# Patient Record
Sex: FEMALE | Race: WHITE | HISPANIC OR LATINO | Employment: FULL TIME | URBAN - METROPOLITAN AREA
[De-identification: names, ages, dates, MRNs, and addresses within clinical notes are randomized per-mention and may not be internally consistent; named-entity substitution may affect disease eponyms.]

---

## 2017-08-03 ENCOUNTER — TELEPHONE (OUTPATIENT)
Dept: INTERNAL MEDICINE | Facility: CLINIC | Age: 22
End: 2017-08-03

## 2017-08-03 ENCOUNTER — OFFICE VISIT (OUTPATIENT)
Dept: INTERNAL MEDICINE | Facility: CLINIC | Age: 22
End: 2017-08-03

## 2017-08-03 VITALS
HEART RATE: 86 BPM | SYSTOLIC BLOOD PRESSURE: 102 MMHG | DIASTOLIC BLOOD PRESSURE: 60 MMHG | WEIGHT: 143 LBS | BODY MASS INDEX: 26.31 KG/M2 | OXYGEN SATURATION: 97 % | HEIGHT: 62 IN

## 2017-08-03 DIAGNOSIS — R10.30 LOWER ABDOMINAL PAIN: ICD-10-CM

## 2017-08-03 DIAGNOSIS — Z00.00 HEALTHCARE MAINTENANCE: Primary | ICD-10-CM

## 2017-08-03 PROCEDURE — 99385 PREV VISIT NEW AGE 18-39: CPT | Performed by: NURSE PRACTITIONER

## 2017-08-03 NOTE — PROGRESS NOTES
"Subjective   Vernell Ornelas is a 21 y.o. female and is here for a comprehensive physical exam. New patient here to establish care.  Health history and questionnaire have been reviewed in its entirety. The patient's previous primary care provider was Dr. Mariajose Shrestha - her pediatrician.  The patient reports lower abdominal pain.  Patient complains of a \"nagging\" pain in her lower abdomen.  She did go to [facility] emergency room on July 31 where she had a CT scan done.  CT scan and labs were normal.  Patient states that when she first had the pain, it lasted a couple of hours and had her doubled over in pain.  Since that time it's been more of a dull ache.  Patient does have an IUD and had not had a menstrual cycle since she had it put in about 2 years ago, but the past couple months she has had some spotting.  She has not seen a gynecologist since having the IUD placed, because she is new to the area.     Do you take any herbs or supplements that were not prescribed by a doctor? no  Are you taking aspirin daily? no     History:  IUD placed summer 2015. She is sexually active. She has not had a pap    The following portions of the patient's history were reviewed and updated as appropriate: allergies, current medications, past family history, past medical history, past social history, past surgical history and problem list.    Review of Systems  Do you have pain that bothers you in your daily life? no  Pertinent items are noted in HPI.    Objective   /60 (BP Location: Left arm, Patient Position: Sitting, Cuff Size: Adult)  Pulse 86  Ht 62\" (157.5 cm)  Wt 143 lb (64.9 kg)  SpO2 97%  BMI 26.16 kg/m2    General Appearance:    Alert, cooperative, no distress, appears stated age   Head:    Normocephalic, without obvious abnormality, atraumatic   Eyes:    PERRL, conjunctiva/corneas clear, EOM's intact, fundi     benign, both eyes   Ears:    Normal TM's and external ear canals, both ears   Nose:   Nares normal, " septum midline, mucosa normal, no drainage    or sinus tenderness   Throat:   Lips, mucosa, and tongue normal; teeth and gums normal   Neck:   Supple, symmetrical, trachea midline, no adenopathy;     thyroid:  no enlargement/tenderness/nodules; no carotid    bruit or JVD   Back:     Symmetric, no curvature, ROM normal, no CVA tenderness   Lungs:     Clear to auscultation bilaterally, respirations unlabored   Chest Wall:    No tenderness or deformity    Heart:    Regular rate and rhythm, S1 and S2 normal, no murmur, rub   or gallop       Abdomen:     Soft, non-tender, bowel sounds active all four quadrants,     no masses, no organomegaly           Extremities:   Extremities normal, atraumatic, no cyanosis or edema   Pulses:   2+ and symmetric all extremities   Skin:   Skin color, texture, turgor normal, no rashes or lesions   Lymph nodes:   Cervical, supraclavicular, and axillary nodes normal   Neurologic:   CNII-XII intact, normal strength, sensation and reflexes     throughout     Reviewed CT abd/pelvis and labs done at [facility] - to be scanned into chart. CT was negative. Labs were normal.      Assessment/Plan   Healthy female exam.     1. Vernell was seen today for annual exam and abdominal pain.    Diagnoses and all orders for this visit:    Healthcare maintenance    Lower abdominal pain  -     Ambulatory Referral to Gynecology    Will try to get her into gynecology asap since she will be leaving for college on aug 12th.    2. Patient Counseling:  --Nutrition: Stressed importance of moderation in sodium/caffeine intake, saturated fat and cholesterol, caloric balance, sufficient intake of fresh fruits, vegetables, fiber, calcium, iron.  --Exercise: Stressed the importance of regular exercise.   --Injury prevention: Discussed safety belts, safety helmets, smoke detector.   --Dental health: Discussed importance of regular tooth brushing, flossing, and dental visits.  --Immunizations reviewed. Patient states that her  immunizations are up to date and she will provide us with a copy of her medical records.     3. Discussed the patient's BMI with her.  The BMI is in the acceptable range  4. Follow up as needed for acute illness

## 2017-08-09 ENCOUNTER — OFFICE VISIT (OUTPATIENT)
Dept: OBSTETRICS AND GYNECOLOGY | Facility: CLINIC | Age: 22
End: 2017-08-09

## 2017-08-09 ENCOUNTER — PROCEDURE VISIT (OUTPATIENT)
Dept: OBSTETRICS AND GYNECOLOGY | Facility: CLINIC | Age: 22
End: 2017-08-09

## 2017-08-09 VITALS
DIASTOLIC BLOOD PRESSURE: 72 MMHG | BODY MASS INDEX: 26.5 KG/M2 | WEIGHT: 144 LBS | HEIGHT: 62 IN | SYSTOLIC BLOOD PRESSURE: 108 MMHG

## 2017-08-09 DIAGNOSIS — Z30.431 IUD CHECK UP: ICD-10-CM

## 2017-08-09 DIAGNOSIS — Z13.9 SCREENING: Primary | ICD-10-CM

## 2017-08-09 DIAGNOSIS — R10.2 PELVIC PAIN: ICD-10-CM

## 2017-08-09 DIAGNOSIS — N89.8 VAGINAL DISCHARGE: ICD-10-CM

## 2017-08-09 DIAGNOSIS — R58 BLEEDING: ICD-10-CM

## 2017-08-09 DIAGNOSIS — R10.2 PELVIC PAIN IN FEMALE: Primary | ICD-10-CM

## 2017-08-09 LAB

## 2017-08-09 PROCEDURE — 99203 OFFICE O/P NEW LOW 30 MIN: CPT | Performed by: OBSTETRICS & GYNECOLOGY

## 2017-08-09 PROCEDURE — 81002 URINALYSIS NONAUTO W/O SCOPE: CPT | Performed by: OBSTETRICS & GYNECOLOGY

## 2017-08-09 PROCEDURE — 81025 URINE PREGNANCY TEST: CPT | Performed by: OBSTETRICS & GYNECOLOGY

## 2017-08-09 PROCEDURE — 76830 TRANSVAGINAL US NON-OB: CPT | Performed by: OBSTETRICS & GYNECOLOGY

## 2017-08-09 NOTE — PROGRESS NOTES
GYN New Pt Exam     CC- Here for pelvic pain    Vernell Ornelas is a 21 y.o. female who presents to establish care and for a 3 week h/o pelvic pain and cramping. She has had a Mirena IUD for 2 years and likes it. She has rare cycles and no pain with sex. She describes this pain as a pressure type sensation over her pubic bone and occ sharp pains in the ovarian area. She has not had any fevers, chills, discharge or VB. She has not noticed any alleviating or aggrevating factors One day her pain was so bad she went to [facility] and had a CT scan and everything was fine. Her pain has not been as bad since then but she wants to make sure her IUD is okay before she goes back to school.     OB History      Para Term  AB TAB SAB Ectopic Multiple Living    0 0 0 0 0 0 0 0 0 0        Obstetric Comments    No plans          Menarche: 17  Current contraception: IUD  History of abnormal Pap smear: no  History of abnormal mammogram: no  Family history of uterine, colon or ovarian cancer: no  Family history of breast cancer: no  H/o STDs: none  S/P Gardasil    Health Maintenance   Topic Date Due   • INFLUENZA VACCINE  2017   • TDAP/TD VACCINES (2 - Td) 2018       Past Medical History:   Diagnosis Date   • Joint pain     or swelling       Past Surgical History:   Procedure Laterality Date   • CYSTECTOMY  2015    cyst removal on left foot        No current outpatient prescriptions on file.    No Known Allergies    Social History   Substance Use Topics   • Smoking status: Never Smoker   • Smokeless tobacco: Never Used   • Alcohol use No       Family History   Problem Relation Age of Onset   • Hypertension Other    • Heart disease Other    • Hypertension Mother    • Thyroid disease Mother    • Depression Mother    • Anxiety disorder Mother    • Heart disease Maternal Grandmother    • Anxiety disorder Maternal Grandfather    • Breast cancer Neg Hx    • Ovarian cancer Neg Hx    • Colon cancer Neg Hx    •  "Deep vein thrombosis Neg Hx    • Pulmonary embolism Neg Hx    • Birth defects Neg Hx    • Mental retardation Neg Hx        Review of Systems   Constitutional: Negative for appetite change, fatigue, fever and unexpected weight change.   Respiratory: Negative for cough and shortness of breath.    Cardiovascular: Negative for chest pain and palpitations.   Gastrointestinal: Negative for abdominal distention, abdominal pain, anal bleeding, blood in stool, constipation, diarrhea, nausea and vomiting.   Genitourinary: Positive for pelvic pain. Negative for dyspareunia, dysuria, flank pain, frequency, hematuria, menstrual problem, vaginal discharge and vaginal pain.   Skin: Negative for color change and rash.   Neurological: Negative for headaches.   Psychiatric/Behavioral: Negative for dysphoric mood. The patient is not nervous/anxious.        /72  Ht 62\" (157.5 cm)  Wt 144 lb (65.3 kg)  LMP 07/14/2017  BMI 26.34 kg/m2    Physical Exam   Constitutional: She is oriented to person, place, and time. She appears well-developed and well-nourished.   HENT:   Head: Normocephalic and atraumatic.   Cardiovascular: Normal rate, regular rhythm and normal heart sounds.    Pulmonary/Chest: Effort normal and breath sounds normal.   Abdominal: Soft. Bowel sounds are normal. She exhibits no distension and no mass. There is no tenderness. There is no rebound and no guarding. No hernia.   Genitourinary: Uterus normal. There is no rash, tenderness, lesion or injury on the right labia. There is no rash, tenderness, lesion or injury on the left labia. Cervix exhibits no motion tenderness, no discharge and no friability. Right adnexum displays no mass, no tenderness and no fullness. Left adnexum displays no mass, no tenderness and no fullness. No erythema, tenderness or bleeding in the vagina. No signs of injury around the vagina. No vaginal discharge found.   Genitourinary Comments: IUD string seen easily  Nonspecific white D/C in " vault  Pain not reproducible on exam.   Musculoskeletal: Normal range of motion.   Neurological: She is alert and oriented to person, place, and time.   Skin: Skin is warm and dry.   Psychiatric: She has a normal mood and affect. Her behavior is normal. Judgment and thought content normal.   Nursing note and vitals reviewed.         Assessment/Plan    1) New pt to establish care  2) Pelvic pain- US today shows IUD in correct position and normal ovaries. CT reviewed and was normal. No clear etiology of pain and it is not reproducible on exam. Will check UA and swab for BV/C/G/T and Mycoplasma. If normal and pain persists may need dx lsc.   3) RHM- plan pap age 23. S/P Gardasil. RTO 1 year annual or prn.    Vernell was seen today for follow-up.    Diagnoses and all orders for this visit:    Screening  -     POC Urinalysis Dipstick  -     POC Pregnancy, Urine    Pelvic pain  -     NuSwab VG+  -     Urine Culture    Vaginal discharge  -     NuSwab VG+  -     Urine Culture          Ashlie Moses MD  8/9/2017  12:28 PM

## 2017-08-11 LAB
BACTERIA UR CULT: NORMAL
BACTERIA UR CULT: NORMAL

## 2017-08-13 LAB
A VAGINAE DNA VAG QL NAA+PROBE: NORMAL SCORE
BVAB2 DNA VAG QL NAA+PROBE: NORMAL SCORE
C ALBICANS DNA VAG QL NAA+PROBE: NEGATIVE
C GLABRATA DNA VAG QL NAA+PROBE: NEGATIVE
C TRACH RRNA SPEC QL NAA+PROBE: NEGATIVE
MEGA1 DNA VAG QL NAA+PROBE: NORMAL SCORE
N GONORRHOEA RRNA SPEC QL NAA+PROBE: NEGATIVE
T VAGINALIS RRNA SPEC QL NAA+PROBE: NEGATIVE

## 2018-05-07 ENCOUNTER — OFFICE VISIT (OUTPATIENT)
Dept: INTERNAL MEDICINE | Facility: CLINIC | Age: 23
End: 2018-05-07

## 2018-05-07 VITALS
BODY MASS INDEX: 26.2 KG/M2 | DIASTOLIC BLOOD PRESSURE: 70 MMHG | WEIGHT: 142.38 LBS | HEIGHT: 62 IN | SYSTOLIC BLOOD PRESSURE: 98 MMHG | OXYGEN SATURATION: 98 % | HEART RATE: 70 BPM

## 2018-05-07 DIAGNOSIS — R22.0 MASS OF FACE: Primary | ICD-10-CM

## 2018-05-07 PROCEDURE — 99213 OFFICE O/P EST LOW 20 MIN: CPT | Performed by: NURSE PRACTITIONER

## 2018-05-07 NOTE — PROGRESS NOTES
Subjective   Vernell Ornelas is a 22 y.o. female. Patient is here today for   Chief Complaint   Patient presents with   • Mass     Pt complains of having a lump near her left ear x1 month.    .    History of Present Illness   C/o lump near left ear x 1 month. It has occasionally been sore. Denies nasal congestion. It has increased a little in size, but has stayed the same size the past 2 weeks.     The following portions of the patient's history were reviewed and updated as appropriate: allergies, current medications, past family history, past medical history, past social history, past surgical history and problem list.    Review of Systems   Constitutional: Negative.    Respiratory: Negative.    Cardiovascular: Negative.    Skin:        mass       Objective   Vitals:    05/07/18 0825   BP: 98/70   Pulse: 70   SpO2: 98%     Physical Exam   Constitutional: Vital signs are normal. She appears well-developed and well-nourished.   HENT:   Right Ear: Tympanic membrane and ear canal normal.   Left Ear: Tympanic membrane and ear canal normal.   Mouth/Throat: Oropharynx is clear and moist and mucous membranes are normal.   Cardiovascular: Normal rate, regular rhythm and normal heart sounds.    Pulmonary/Chest: Effort normal and breath sounds normal.   Skin: Skin is warm, dry and intact.   Soft tissue mass/cyst anterior to the tragus of her left ear   Psychiatric: She has a normal mood and affect. Her speech is normal and behavior is normal. Thought content normal.   Nursing note and vitals reviewed.      Assessment/Plan   Vernell was seen today for mass.    Diagnoses and all orders for this visit:    Mass of face  -     Ambulatory Referral to Dermatology

## 2019-09-26 ENCOUNTER — OFFICE VISIT (OUTPATIENT)
Dept: INTERNAL MEDICINE | Facility: CLINIC | Age: 24
End: 2019-09-26

## 2019-09-26 VITALS
OXYGEN SATURATION: 99 % | DIASTOLIC BLOOD PRESSURE: 62 MMHG | SYSTOLIC BLOOD PRESSURE: 106 MMHG | HEART RATE: 69 BPM | BODY MASS INDEX: 26.31 KG/M2 | HEIGHT: 62 IN | WEIGHT: 143 LBS

## 2019-09-26 DIAGNOSIS — Z01.419 ENCOUNTER FOR GYNECOLOGICAL EXAMINATION: ICD-10-CM

## 2019-09-26 DIAGNOSIS — Z00.00 HEALTHCARE MAINTENANCE: Primary | ICD-10-CM

## 2019-09-26 LAB
B-HCG UR QL: NEGATIVE
INTERNAL NEGATIVE CONTROL: NEGATIVE
INTERNAL POSITIVE CONTROL: POSITIVE
Lab: NORMAL

## 2019-09-26 PROCEDURE — 99395 PREV VISIT EST AGE 18-39: CPT | Performed by: NURSE PRACTITIONER

## 2019-09-26 PROCEDURE — 81025 URINE PREGNANCY TEST: CPT | Performed by: NURSE PRACTITIONER

## 2019-09-26 NOTE — PROGRESS NOTES
"Alexus Adhikari is a 24 y.o. female and is here for a comprehensive physical exam. The patient reports no problems.    Do you take any herbs or supplements that were not prescribed by a doctor? no     History:  LMP: Mirena  It has been in for 4 years. She will reschedule her appt with GYN and have it removed/changed in Jan 2020.  Last pap date: first one today    The following portions of the patient's history were reviewed and updated as appropriate: allergies, current medications, past family history, past medical history, past social history, past surgical history and problem list.    Review of Systems  Do you have pain that bothers you in your daily life? no  A comprehensive review of systems was negative.    Objective   /62 (BP Location: Left arm, Patient Position: Sitting, Cuff Size: Adult)   Pulse 69   Ht 157.5 cm (62\")   Wt 64.9 kg (143 lb)   SpO2 99%   BMI 26.16 kg/m²     General Appearance:    Alert, cooperative, no distress, appears stated age   Head:    Normocephalic, without obvious abnormality, atraumatic   Eyes:    PERRL, conjunctiva/corneas clear, EOM's intact, both eyes   Ears:    Normal TM's and external ear canals, both ears   Nose:   Nares normal, septum midline, mucosa normal, no drainage    or sinus tenderness   Throat:   Lips, mucosa, and tongue normal; teeth and gums normal   Neck:   Supple, symmetrical, trachea midline, no adenopathy;     thyroid:  no enlargement/tenderness/nodules; no carotid    bruit   Back:     Symmetric, no curvature, ROM normal, no CVA tenderness   Lungs:     Clear to auscultation bilaterally, respirations unlabored   Chest Wall:    No tenderness or deformity    Heart:    Regular rate and rhythm, S1 and S2 normal, no murmur       Abdomen:     Soft, non-tender, bowel sounds active all four quadrants,     no masses, no organomegaly   Genitalia:    Normal female without lesion, discharge or tenderness   Rectal:    Normal tone, no masses or " tenderness; guaiac negative stool   Extremities:   Extremities normal, atraumatic, no cyanosis or edema   Pulses:   2+ and symmetric all extremities   Skin:   Skin color, texture, turgor normal, no rashes or lesions   Lymph nodes:   Cervical, supraclavicular, and axillary nodes normal   Neurologic:   Grossly intact, normal strength, sensation and reflexes     throughout     Results for orders placed or performed in visit on 09/26/19   POCT pregnancy, urine   Result Value Ref Range    HCG, Urine, QL Negative Negative    Lot Number [id number]     Internal Positive Control Positive     Internal Negative Control Negative         Assessment/Plan   Healthy female exam.      1. Vernell was seen today for annual exam.    Diagnoses and all orders for this visit:    Healthcare maintenance  -     Pap IG, Rfx HPV All Pth    Encounter for gynecological examination  -     Pap IG, Rfx HPV All Pth  -     POCT pregnancy, urine        2. Patient Counseling:  --Nutrition: Stressed importance of moderation in sodium/caffeine intake, saturated fat and cholesterol, caloric balance, sufficient intake of fresh fruits, vegetables, fiber, calcium, iron.   --Exercise: Stressed the importance of regular exercise. Cross Fit, run  --Injury prevention: Discussed safety belts, safety helmets, smoke detector.   --Dental health: Discussed importance of regular tooth brushing, flossing, and dental visits.    --Immunizations reviewed.    3. Discussed the patient's BMI with her.  The BMI is in the acceptable range  4. Follow up as needed for acute illness

## 2019-09-30 LAB
CONV .: NORMAL
CYTOLOGIST CVX/VAG CYTO: NORMAL
CYTOLOGY CVX/VAG DOC CYTO: NORMAL
CYTOLOGY CVX/VAG DOC THIN PREP: NORMAL
DX ICD CODE: NORMAL
HIV 1 & 2 AB SER-IMP: NORMAL
OTHER STN SPEC: NORMAL
STAT OF ADQ CVX/VAG CYTO-IMP: NORMAL

## 2020-02-26 ENCOUNTER — OFFICE VISIT (OUTPATIENT)
Dept: OBSTETRICS AND GYNECOLOGY | Facility: CLINIC | Age: 25
End: 2020-02-26

## 2020-02-26 VITALS
SYSTOLIC BLOOD PRESSURE: 110 MMHG | HEIGHT: 62 IN | WEIGHT: 143 LBS | BODY MASS INDEX: 26.31 KG/M2 | DIASTOLIC BLOOD PRESSURE: 68 MMHG

## 2020-02-26 DIAGNOSIS — N94.2 VAGINISMUS: ICD-10-CM

## 2020-02-26 DIAGNOSIS — Z13.9 SCREENING FOR CONDITION: ICD-10-CM

## 2020-02-26 DIAGNOSIS — Z01.419 PAP SMEAR, LOW-RISK: ICD-10-CM

## 2020-02-26 DIAGNOSIS — Z01.419 WELL WOMAN EXAM: Primary | ICD-10-CM

## 2020-02-26 DIAGNOSIS — Z30.432 ENCOUNTER FOR IUD REMOVAL: ICD-10-CM

## 2020-02-26 PROBLEM — R10.30 LOWER ABDOMINAL PAIN: Status: RESOLVED | Noted: 2017-08-03 | Resolved: 2020-02-26

## 2020-02-26 LAB
BILIRUB BLD-MCNC: NEGATIVE MG/DL
CLARITY, POC: CLEAR
COLOR UR: YELLOW
GLUCOSE UR STRIP-MCNC: NEGATIVE MG/DL
KETONES UR QL: NEGATIVE
LEUKOCYTE EST, POC: NEGATIVE
NITRITE UR-MCNC: NEGATIVE MG/ML
PH UR: 6.5 [PH] (ref 5–8)
PROT UR STRIP-MCNC: NEGATIVE MG/DL
RBC # UR STRIP: NEGATIVE /UL
SP GR UR: 1 (ref 1–1.03)
UROBILINOGEN UR QL: NORMAL

## 2020-02-26 PROCEDURE — 99395 PREV VISIT EST AGE 18-39: CPT | Performed by: OBSTETRICS & GYNECOLOGY

## 2020-02-26 PROCEDURE — 58301 REMOVE INTRAUTERINE DEVICE: CPT | Performed by: OBSTETRICS & GYNECOLOGY

## 2020-02-26 PROCEDURE — 81002 URINALYSIS NONAUTO W/O SCOPE: CPT | Performed by: OBSTETRICS & GYNECOLOGY

## 2020-02-26 NOTE — PROGRESS NOTES
GYN Annual Exam     CC- Here for annual exam.     Vernell Adhikari is a 24 y.o. female established patient who presents for annual well woman exam.  She was last seen in 2017. She has a mirena IUD that is expiring this year and she would like to have it removed. She is now  and her  in away in the . They are undecided about what they want for contraception. No plans for children soon. She is having some pain with sex and has vaginismus on exam but declines PT for now.     OB History        0    Para   0    Term   0       0    AB   0    Living   0       SAB   0    TAB   0    Ectopic   0    Molar        Multiple   0    Live Births              Obstetric Comments   No plans             Menarche: 17  Current contraception: IUD Mirena and placed   History of abnormal Pap smear: no  History of abnormal mammogram: no  Family history of uterine, colon or ovarian cancer: no  Family history of breast cancer: no  H/o STDs: none  Last pap:?  Gardasil:completed  ELIAZAR: PGF? DVT    Health Maintenance   Topic Date Due   • TDAP/TD VACCINES (2 - Td) 2018   • CHLAMYDIA SCREENING  2018   • ANNUAL PHYSICAL  2020   • Annual Gynecologic Pelvic and Breast Exam  2021   • PAP SMEAR  2022   • INFLUENZA VACCINE  Completed   • MENINGOCOCCAL VACCINE (Normal Risk)  Completed   • HPV VACCINES  Completed       Past Medical History:   Diagnosis Date   • Joint pain     or swelling       Past Surgical History:   Procedure Laterality Date   • CYSTECTOMY  2015    cyst removal on left foot          Current Outpatient Medications:   •  levonorgestrel (MIRENA) 20 MCG/24HR IUD, 1 Intra Uterine Device by Intrauterine route 1 (One) Time., Disp: , Rfl:     No Known Allergies    Social History     Tobacco Use   • Smoking status: Never Smoker   • Smokeless tobacco: Never Used   Substance Use Topics   • Alcohol use: No   • Drug use: No       Family History   Problem Relation Age of Onset   •  "Hypertension Other    • Heart disease Other    • Hypertension Mother    • Thyroid disease Mother    • Depression Mother    • Anxiety disorder Mother    • Heart disease Maternal Grandmother    • Anxiety disorder Maternal Grandfather    • Deep vein thrombosis Paternal Grandfather    • Breast cancer Neg Hx    • Ovarian cancer Neg Hx    • Colon cancer Neg Hx    • Pulmonary embolism Neg Hx    • Birth defects Neg Hx    • Mental retardation Neg Hx        Review of Systems   Constitutional: Negative for appetite change, fatigue, fever and unexpected weight change.   Eyes: Negative for photophobia and visual disturbance.   Respiratory: Negative for cough and shortness of breath.    Cardiovascular: Negative for chest pain and palpitations.   Gastrointestinal: Negative for abdominal distention, abdominal pain, constipation, diarrhea and nausea.   Endocrine: Negative for cold intolerance and heat intolerance.   Genitourinary: Positive for dyspareunia. Negative for dysuria, menstrual problem, pelvic pain and vaginal discharge.   Musculoskeletal: Negative for back pain.   Skin: Negative for color change and rash.   Neurological: Negative for headaches.   Hematological: Negative for adenopathy. Does not bruise/bleed easily.   Psychiatric/Behavioral: Negative for dysphoric mood. The patient is not nervous/anxious.        /68   Ht 157.5 cm (62\")   Wt 64.9 kg (143 lb)   BMI 26.16 kg/m²     Physical Exam   Constitutional: She is oriented to person, place, and time. She appears well-developed and well-nourished.   HENT:   Head: Normocephalic and atraumatic.   Eyes: Conjunctivae are normal. No scleral icterus.   Neck: Neck supple. No thyromegaly present.   Cardiovascular: Normal rate and regular rhythm.   Pulmonary/Chest: Effort normal and breath sounds normal. Right breast exhibits no inverted nipple, no mass, no nipple discharge, no skin change and no tenderness. Left breast exhibits no inverted nipple, no mass, no nipple " discharge, no skin change and no tenderness.   Abdominal: Soft. Bowel sounds are normal. She exhibits no distension and no mass. There is no tenderness. There is no rebound and no guarding. No hernia.   Genitourinary: Pelvic exam was performed with patient supine. There is no rash, tenderness or lesion on the right labia. There is no rash, tenderness or lesion on the left labia. Uterus is not deviated, not enlarged, not fixed and not tender. Cervix exhibits no motion tenderness, no discharge and no friability. Right adnexum displays no mass, no tenderness and no fullness. Left adnexum displays no mass, no tenderness and no fullness. There is tenderness in the vagina. No erythema or bleeding in the vagina. No foreign body in the vagina. No signs of injury around the vagina. No vaginal discharge found.   Genitourinary Comments: + LPS  See IUD removal note   Neurological: She is alert and oriented to person, place, and time.   Skin: Skin is warm and dry.   Psychiatric: She has a normal mood and affect. Her behavior is normal. Judgment and thought content normal.   Nursing note and vitals reviewed.      IUD Removal Procedure Note    Type of IUD:  Mirena  Date of insertion:  known  Reason for removal:  Device expiration  Other relevant history/information:  none  UPT: negative    Procedure Time Out Documentation      Procedure Details  IUD strings visible:  yes  Local anesthesia:  None  Tenaculum used:  None  Removal:  IUD strings grasped and IUD removed intact with gentle traction.  The patient tolerated the procedure well.    All appropriate instructions regarding removal were reviewed.    Tolerated well  No apparent complications  Post procedure diagnosis : IUD removal     Plans for contraception:  undecided    Other follow-up needed:  none    The patient was advised to call for any fever or for prolonged or severe pain or bleeding. She was advised to use OTC ibuprofen as needed for mild to moderate pain.                 Assessment/Plan    1) GYN HM: pap/G/C/T  SBE demonstrated and encouraged.  2) STD screening: declines Condoms encouraged.  3) Contraception: abstinence  4) Family Planning: family planning: no plans at present, encourage folic acid daily  5) Diet and Exercise discussed  6) Smoking Status: No  7) Social: ,  deployed  8) MMG- plan age 42  9)Follow up prn or 1 year       Vernell was seen today for gynecologic exam.    Diagnoses and all orders for this visit:    Well woman exam  -     Pap IG, Ct-Ng TV Rfx HPV ASCU    Screening for condition  -     POC Urinalysis Dipstick    Pap smear, low-risk  -     Pap IG, Ct-Ng TV Rfx HPV ASCU    Encounter for IUD removal    Vaginismus          Ashlie Moses MD  02/26/2020    10:47 PM

## 2020-02-28 LAB
C TRACH RRNA CVX QL NAA+PROBE: NEGATIVE
CONV .: NORMAL
CYTOLOGIST CVX/VAG CYTO: NORMAL
CYTOLOGY CVX/VAG DOC CYTO: NORMAL
CYTOLOGY CVX/VAG DOC THIN PREP: NORMAL
DX ICD CODE: NORMAL
HIV 1 & 2 AB SER-IMP: NORMAL
N GONORRHOEA RRNA CVX QL NAA+PROBE: NEGATIVE
OTHER STN SPEC: NORMAL
STAT OF ADQ CVX/VAG CYTO-IMP: NORMAL
T VAGINALIS RRNA SPEC QL NAA+PROBE: NEGATIVE

## 2020-06-05 ENCOUNTER — OFFICE VISIT (OUTPATIENT)
Dept: INTERNAL MEDICINE | Facility: CLINIC | Age: 25
End: 2020-06-05

## 2020-06-05 VITALS
SYSTOLIC BLOOD PRESSURE: 114 MMHG | HEART RATE: 51 BPM | WEIGHT: 139 LBS | DIASTOLIC BLOOD PRESSURE: 62 MMHG | OXYGEN SATURATION: 99 % | HEIGHT: 62 IN | BODY MASS INDEX: 25.58 KG/M2 | TEMPERATURE: 97.8 F

## 2020-06-05 DIAGNOSIS — Z30.09 GENERAL COUNSELING AND ADVICE FOR CONTRACEPTIVE MANAGEMENT: ICD-10-CM

## 2020-06-05 DIAGNOSIS — M85.679 BONE CYST OF FOOT: Primary | ICD-10-CM

## 2020-06-05 PROCEDURE — 99213 OFFICE O/P EST LOW 20 MIN: CPT | Performed by: NURSE PRACTITIONER

## 2020-06-09 NOTE — PROGRESS NOTES
Subjective  Answers for HPI/ROS submitted by the patient on 6/3/2020   What is the primary reason for your visit?: Other  Please describe your symptoms.: lump on top of right foot  Have you had these symptoms before?: Yes  How long have you been having these symptoms?: Greater than 2 weeks    Vernell Adhikari is a 24 y.o. female. Patient is here today for   Chief Complaint   Patient presents with   • Foot Problem     Pt complains of having a lump on the top of her right foot.    .    History of Present Illness   New problem to this provider: Patient complains of having a lump on the top of her right foot.  She noticed this a few weeks ago.  She had a similar issue on her left foot in the past and it was found to be a bone cyst.  Denies any injury.  It is tender to touch.    She is also requesting a referral to GYN to have an IUD placed. She has seen Dr. Moses, but thinks that she needs a new referral due to her insurance.    The following portions of the patient's history were reviewed and updated as appropriate: allergies, current medications, past family history, past medical history, past social history, past surgical history and problem list.    Review of Systems   Constitutional: Negative.    Respiratory: Negative.    Cardiovascular: Negative.    Musculoskeletal:        Foot pain       Objective   Vitals:    06/05/20 1126   BP: 114/62   Pulse: 51   Temp: 97.8 °F (36.6 °C)   SpO2: 99%     Body mass index is 25.42 kg/m².  Physical Exam   Constitutional: She is oriented to person, place, and time. Vital signs are normal. She appears well-developed and well-nourished.   Cardiovascular: Normal rate, regular rhythm and normal heart sounds.   Pulmonary/Chest: Effort normal and breath sounds normal.        Neurological: She is alert and oriented to person, place, and time.   Skin: Skin is warm, dry and intact.   Psychiatric: She has a normal mood and affect. Her speech is normal and behavior is normal. Thought  content normal.   Nursing note and vitals reviewed.      Assessment/Plan   Vernell was seen today for foot problem.    Diagnoses and all orders for this visit:    Bone cyst of foot  -     Ambulatory Referral to Orthopedic Surgery    General counseling and advice for contraceptive management  -     Ambulatory Referral to Gynecology

## 2020-06-16 ENCOUNTER — TELEPHONE (OUTPATIENT)
Dept: OBSTETRICS AND GYNECOLOGY | Facility: CLINIC | Age: 25
End: 2020-06-16

## 2020-06-16 NOTE — TELEPHONE ENCOUNTER
Patient requesting Malaika IUD for contraception, ok to check benefits and schedule when on cycle?

## 2020-06-25 ENCOUNTER — OFFICE VISIT (OUTPATIENT)
Dept: ORTHOPEDIC SURGERY | Facility: CLINIC | Age: 25
End: 2020-06-25

## 2020-06-25 VITALS — WEIGHT: 140 LBS | HEIGHT: 62 IN | BODY MASS INDEX: 25.76 KG/M2 | TEMPERATURE: 98.7 F

## 2020-06-25 DIAGNOSIS — R22.41 MASS OF FOOT, RIGHT: Primary | ICD-10-CM

## 2020-06-25 DIAGNOSIS — M79.671 RIGHT FOOT PAIN: ICD-10-CM

## 2020-06-25 PROCEDURE — 73630 X-RAY EXAM OF FOOT: CPT | Performed by: ORTHOPAEDIC SURGERY

## 2020-06-25 PROCEDURE — 99244 OFF/OP CNSLTJ NEW/EST MOD 40: CPT | Performed by: ORTHOPAEDIC SURGERY

## 2020-06-25 NOTE — PROGRESS NOTES
"   New Patient Complaint      Patient: Vernell Adhikari  YOB: 1995 24 y.o. female  Medical Record Number: [id number]    Chief Complaints: My foot hurts    History of Present Illness: Patient noticed a mass in the dorsum of her right foot several months ago but was not painful until about 1 month ago.  She is not recall any specific injury and has felt it has increased in size but denies any numbness or tingling.    She reports mild intermittent aching pain worse with walking.    She had a history in around 2015 of a cyst removal from her left foot she said she thinks was a \"bone cyst\".  This was done by a pediatric orthopedist at [facility] there but she cannot member who.  She currently coaches gymnastics         She is seen today at the request of JIMI Chicas who has requested my opinion regarding etiology and treatment of this condition    HPI    Allergies: No Known Allergies    Medications:   No current outpatient medications on file prior to visit.     No current facility-administered medications on file prior to visit.        Past Medical History:   Diagnosis Date   • Joint pain     or swelling     Past Surgical History:   Procedure Laterality Date   • CYSTECTOMY  07/2015    cyst removal on left foot    • WISDOM TOOTH EXTRACTION       Social History     Occupational History   • Not on file   Tobacco Use   • Smoking status: Never Smoker   • Smokeless tobacco: Never Used   Substance and Sexual Activity   • Alcohol use: No   • Drug use: No   • Sexual activity: Not Currently     Partners: Male     Birth control/protection: IUD      Social History     Social History Narrative   • Not on file     Family History   Problem Relation Age of Onset   • Hypertension Other    • Heart disease Other    • Hypertension Mother    • Thyroid disease Mother    • Depression Mother    • Anxiety disorder Mother    • Heart disease Maternal Grandmother    • Anxiety disorder Maternal Grandfather    • Deep vein thrombosis " "Paternal Grandfather    • Breast cancer Neg Hx    • Ovarian cancer Neg Hx    • Colon cancer Neg Hx    • Pulmonary embolism Neg Hx    • Birth defects Neg Hx    • Mental retardation Neg Hx        Review of Systems: 14 point review of systems performed, positive pertinent findings identified in HPI. All remaining systems negative     Review of Systems      Physical Exam:   Vitals:    06/25/20 0820   Temp: 98.7 °F (37.1 °C)   Weight: 63.5 kg (140 lb)   Height: 157.5 cm (62\")   PainSc:   5     Physical Exam   Constitutional: pleasant, well developed   Eyes: sclera non icteric  Hearing : adequate for exam  Cardiovascular: palpable pulses in right foot, right calf/ thigh NT without sign of DVT  Respiratoy: breathing unlabored   Neurological: grossly sensate to LT throughout right LE  Psychiatric: oriented with normal mood and affect.   Lymphatic: No palpable popliteal lymphadenopathy right LE  Skin: intact throughout right leg/foot  Musculoskeletal: Right foot shows no warmth erythema there is a small area of fullness over the dorsal central midfoot that measures about 1 cm x 1 cm and is slightly mobile overlying skin is intact and she was able to flex and extend her toes well and there was a negative Tinel's over this area.  Physical Exam  Ortho Exam    Radiology: 3 views of the right foot ordered evaluate pain and reviewed and no prior x-rays available for comparison I do not see any obvious cystic lesions of the bone or any obvious fractures or malalignment through the midfoot.  There is relative elongation of the second and third metatarsals but no evidence of stress fracture.    Assessment/Plan: 1.  Right dorsal foot mass    Reviewed her that did not see any obvious bony component to this on x-ray and is using more localized to the soft tissues.  We need to get an MRI with IV contrast for further evaluation of this mass and to make sure is no bony involvement and she understands to call to schedule follow-up " appointment after MRI has been scheduled in order results in the office and will determine treatment course going forward once we have these results.

## 2020-07-15 ENCOUNTER — TELEPHONE (OUTPATIENT)
Dept: ORTHOPEDIC SURGERY | Facility: CLINIC | Age: 25
End: 2020-07-15

## 2020-07-22 ENCOUNTER — TELEPHONE (OUTPATIENT)
Dept: ORTHOPEDIC SURGERY | Facility: CLINIC | Age: 25
End: 2020-07-22

## 2020-07-22 NOTE — TELEPHONE ENCOUNTER
"I spoke with patient after receiving message that she wanted to not do her MRI.    She said her foot is \"really not bothering her right now\" and that the mass seems to be shrinking.    Reviewed with her that she has any increase in size or pain let us know and we will get the MRI otherwise we will take a wait-and-see approach and will be happy to see her back if she has any recurrent problems.  She appreciated the call  "

## 2020-10-07 ENCOUNTER — OFFICE VISIT (OUTPATIENT)
Dept: OBSTETRICS AND GYNECOLOGY | Facility: CLINIC | Age: 25
End: 2020-10-07

## 2020-10-07 VITALS
WEIGHT: 141 LBS | HEIGHT: 62 IN | DIASTOLIC BLOOD PRESSURE: 70 MMHG | SYSTOLIC BLOOD PRESSURE: 132 MMHG | BODY MASS INDEX: 25.95 KG/M2

## 2020-10-07 DIAGNOSIS — Z30.430 ENCOUNTER FOR IUD INSERTION: Primary | ICD-10-CM

## 2020-10-07 PROCEDURE — 58300 INSERT INTRAUTERINE DEVICE: CPT | Performed by: OBSTETRICS & GYNECOLOGY

## 2020-10-07 PROCEDURE — 81025 URINE PREGNANCY TEST: CPT | Performed by: OBSTETRICS & GYNECOLOGY

## 2020-10-07 NOTE — PROGRESS NOTES
Procedure: Intrauterine device insertion    Pre procedure indication 1) Desires Malaika  Post procedure indication 1) Desires Malaika    The risks, benefits, and alternatives to IUD were explained at length with the patient. All her questions were answered and consents were signed.  Urine pregnancy test was negative.  Patient is on her cycle. She currently uses: Contraception: abstinence ( her  is in the )    The patient was placed in a dorsal lithotomy position on the examining table in [facility]. A bimanual exam confirmed the uterus was normal in size, anteverted. A warmed metal speculum was inserted into the vagina and the cervix was brought into view.    The cervix was prepped with Betadine. The anterior lip was grasped with a single-tooth tenaculum. The endometrial cavity was then sounded to 6.5 cm without use of a dilator. The IUD was removed in a sterile fashion.    The  was then carefully advanced to the cervical canal into the uterus to the level of the fundus. This was then backed off about 1.5-2 cm to allow sufficient space for the arms to open. The device was deployed. The  was removed carefully from the uterus. The threads were then cut leaving 2-3 cm visible outside of the cervix.  The single-tooth tenaculum was removed from the anterior lip. Good hemostasis was noted.     All other instruments were removed from the vagina.   There were no complications.  The patient tolerated the procedure well with a minimal amount of discomfort.    The patient was counseled about the need to return in 4 weeks for string check.     She was counseled about the need to use a backup method of contraception such as condoms for 1-2 weeks. The patient is counseled to contact us if she has any significant or increasing bleeding, pain, fever, chills, or other concerns. She is instructed to see a doctor right away if she believes that she may be pregnant at any time with the IUD in place.    Ashlie  Cindy Moses MD    10/7/2020  14:47 EDT

## 2020-11-11 ENCOUNTER — OFFICE VISIT (OUTPATIENT)
Dept: OBSTETRICS AND GYNECOLOGY | Facility: CLINIC | Age: 25
End: 2020-11-11

## 2020-11-11 VITALS
DIASTOLIC BLOOD PRESSURE: 60 MMHG | WEIGHT: 139 LBS | BODY MASS INDEX: 25.58 KG/M2 | HEIGHT: 62 IN | SYSTOLIC BLOOD PRESSURE: 108 MMHG

## 2020-11-11 DIAGNOSIS — Z13.9 SCREENING FOR CONDITION: Primary | ICD-10-CM

## 2020-11-11 DIAGNOSIS — T83.32XA INTRAUTERINE CONTRACEPTIVE DEVICE THREADS LOST, INITIAL ENCOUNTER: ICD-10-CM

## 2020-11-11 PROCEDURE — 81025 URINE PREGNANCY TEST: CPT | Performed by: OBSTETRICS & GYNECOLOGY

## 2020-11-11 PROCEDURE — 99213 OFFICE O/P EST LOW 20 MIN: CPT | Performed by: OBSTETRICS & GYNECOLOGY

## 2020-11-11 NOTE — PROGRESS NOTES
"Vernell Adhikari is a 25 y.o. patient who presents for follow up of   Chief Complaint   Patient presents with   • Follow-up     string ck       26 yo est pt here for Malaika IUD string check. It was placed on 10/7/2020. She is having spotting but \"nothing terrible\" . No pain.       The following portions of the patient's history were reviewed and updated as appropriate: allergies, current medications and problem list.    Review of Systems   Constitutional: Positive for activity change. Negative for appetite change, fatigue, fever and unexpected weight change.   Eyes: Negative for photophobia and visual disturbance.   Respiratory: Negative for cough and shortness of breath.    Cardiovascular: Negative for chest pain and palpitations.   Gastrointestinal: Negative for abdominal distention, abdominal pain, constipation, diarrhea and nausea.   Endocrine: Negative for cold intolerance and heat intolerance.   Genitourinary: Positive for vaginal bleeding. Negative for dyspareunia, dysuria, menstrual problem, pelvic pain and vaginal discharge.   Musculoskeletal: Negative for back pain.   Skin: Negative for color change and rash.   Neurological: Negative for headaches.   Hematological: Negative for adenopathy. Does not bruise/bleed easily.   Psychiatric/Behavioral: Negative for dysphoric mood. The patient is not nervous/anxious.        /60   Ht 157.5 cm (62\")   Wt 63 kg (139 lb)   Breastfeeding No   BMI 25.42 kg/m²     Physical Exam  Vitals signs and nursing note reviewed. Exam conducted with a chaperone present.   Constitutional:       Appearance: Normal appearance. She is well-developed and normal weight.   HENT:      Head: Normocephalic and atraumatic.   Eyes:      General: No scleral icterus.     Conjunctiva/sclera: Conjunctivae normal.   Neck:      Thyroid: No thyromegaly.   Abdominal:      General: There is no distension.      Palpations: Abdomen is soft. There is no mass.      Tenderness: There is no " abdominal tenderness. There is no guarding or rebound.      Hernia: No hernia is present.   Genitourinary:     General: Normal vulva.      Vagina: Normal.      Cervix: Normal.      Uterus: Normal.       Adnexa: Right adnexa normal and left adnexa normal.      Comments: IUD string not seen  Skin:     General: Skin is warm and dry.   Neurological:      Mental Status: She is alert and oriented to person, place, and time.   Psychiatric:         Mood and Affect: Mood normal.         Behavior: Behavior normal.         Thought Content: Thought content normal.         Judgment: Judgment normal.         A/P:  1. Lost IUD- US today shows a 7 cm uterus with normal ovaries and a correctly positioned IUD. US compared to her scan on 8/9/17.  2. RHM - RTO 2/2021 annual or prn.     Assessment/Plan   Diagnoses and all orders for this visit:    1. Screening for condition (Primary)  -     POC Pregnancy, Urine    2. Intrauterine contraceptive device threads lost, initial encounter                 No follow-ups on file.      Ashlie Moses MD    11/11/2020  21:00 EST

## 2021-03-03 ENCOUNTER — OFFICE VISIT (OUTPATIENT)
Dept: OBSTETRICS AND GYNECOLOGY | Facility: CLINIC | Age: 26
End: 2021-03-03

## 2021-03-03 VITALS
WEIGHT: 139.8 LBS | SYSTOLIC BLOOD PRESSURE: 120 MMHG | BODY MASS INDEX: 25.73 KG/M2 | HEIGHT: 62 IN | DIASTOLIC BLOOD PRESSURE: 72 MMHG

## 2021-03-03 DIAGNOSIS — Z97.5 IUD CONTRACEPTION: ICD-10-CM

## 2021-03-03 DIAGNOSIS — Z01.419 ROUTINE GYNECOLOGICAL EXAMINATION: ICD-10-CM

## 2021-03-03 DIAGNOSIS — Z01.419 PAP SMEAR, LOW-RISK: Primary | ICD-10-CM

## 2021-03-03 LAB
B-HCG UR QL: NEGATIVE
BILIRUB BLD-MCNC: NEGATIVE MG/DL
CLARITY, POC: CLEAR
COLOR UR: YELLOW
GLUCOSE UR STRIP-MCNC: NEGATIVE MG/DL
INTERNAL NEGATIVE CONTROL: NEGATIVE
INTERNAL POSITIVE CONTROL: POSITIVE
KETONES UR QL: NEGATIVE
LEUKOCYTE EST, POC: NEGATIVE
Lab: 55
NITRITE UR-MCNC: NEGATIVE MG/ML
PH UR: 5 [PH] (ref 5–8)
PROT UR STRIP-MCNC: NEGATIVE MG/DL
RBC # UR STRIP: NEGATIVE /UL
SP GR UR: 1.01 (ref 1–1.03)
UROBILINOGEN UR QL: NORMAL

## 2021-03-03 PROCEDURE — 81025 URINE PREGNANCY TEST: CPT | Performed by: OBSTETRICS & GYNECOLOGY

## 2021-03-03 PROCEDURE — 81002 URINALYSIS NONAUTO W/O SCOPE: CPT | Performed by: OBSTETRICS & GYNECOLOGY

## 2021-03-03 PROCEDURE — 99395 PREV VISIT EST AGE 18-39: CPT | Performed by: OBSTETRICS & GYNECOLOGY

## 2021-03-03 PROCEDURE — 58301 REMOVE INTRAUTERINE DEVICE: CPT | Performed by: OBSTETRICS & GYNECOLOGY

## 2021-03-03 RX ORDER — LEVONORGESTREL 13.5 MG/1
1 INTRAUTERINE DEVICE INTRAUTERINE ONCE
COMMUNITY

## 2021-03-03 NOTE — PROGRESS NOTES
GYN Annual Exam     CC- Here for annual exam.     Vernell Adhikari is a 25 y.o. female established patient who presents for annual well woman exam.  She had a Malaika IUD placed in 10/2020 and is not having much of a cycle.her strings were not visible but she had an US in 2020 confirming placement.  She is happy with her IUD. Her  is back from the  and she is not having any pain with sex anymore.     OB History        0    Para   0    Term   0       0    AB   0    Living   0       SAB   0    TAB   0    Ectopic   0    Molar        Multiple   0    Live Births              Obstetric Comments   No plans             Menarche: 17  Current contraception: Malaika placed 10/2020  History of abnormal Pap smear: no  History of abnormal mammogram: no  Family history of uterine, colon or ovarian cancer: no  Family history of breast cancer: no  H/o STDs: none  Last pap: 2020- nl pap  Gardasil:completed  ELIAZAR: PGF? DVT    Health Maintenance   Topic Date Due   • HEPATITIS C SCREENING  2017   • INFLUENZA VACCINE  2020   • ANNUAL PHYSICAL  2020   • CHLAMYDIA SCREENING  2021   • Annual Gynecologic Pelvic and Breast Exam  2021   • PAP SMEAR  2023   • TDAP/TD VACCINES (3 - Td) 10/23/2029   • MENINGOCOCCAL VACCINE  Completed   • HPV VACCINES  Completed   • Pneumococcal Vaccine 0-64  Aged Out       Past Medical History:   Diagnosis Date   • Joint pain     or swelling       Past Surgical History:   Procedure Laterality Date   • CYSTECTOMY  2015    cyst removal on left foot    • WISDOM TOOTH EXTRACTION           Current Outpatient Medications:   •  Levonorgestrel (Malaika) 13.5 MG intrauterine device IUD, 1 each by Intrauterine route 1 (One) Time., Disp: , Rfl:     No Known Allergies    Social History     Tobacco Use   • Smoking status: Never Smoker   • Smokeless tobacco: Never Used   Substance Use Topics   • Alcohol use: No   • Drug use: No       Family History   Problem  "Relation Age of Onset   • Hypertension Other    • Heart disease Other    • Hypertension Mother    • Thyroid disease Mother    • Depression Mother    • Anxiety disorder Mother    • Heart disease Maternal Grandmother    • Anxiety disorder Maternal Grandfather    • Deep vein thrombosis Paternal Grandfather    • Breast cancer Neg Hx    • Ovarian cancer Neg Hx    • Colon cancer Neg Hx    • Pulmonary embolism Neg Hx    • Birth defects Neg Hx    • Mental retardation Neg Hx        Review of Systems   Constitutional: Positive for activity change. Negative for appetite change, fatigue, fever and unexpected weight change.   Eyes: Negative for photophobia and visual disturbance.   Respiratory: Negative for cough and shortness of breath.    Cardiovascular: Negative for chest pain and palpitations.   Gastrointestinal: Negative for abdominal distention, abdominal pain, constipation, diarrhea and nausea.   Endocrine: Negative for cold intolerance and heat intolerance.   Genitourinary: Negative for dyspareunia, dysuria, menstrual problem, pelvic pain, vaginal bleeding and vaginal discharge.   Musculoskeletal: Negative for back pain.   Skin: Negative for color change and rash.   Neurological: Negative for headaches.   Hematological: Negative for adenopathy. Does not bruise/bleed easily.   Psychiatric/Behavioral: Negative for dysphoric mood. The patient is not nervous/anxious.        /72   Ht 157.5 cm (62.01\")   Wt 63.4 kg (139 lb 12.8 oz)   BMI 25.56 kg/m²     Physical Exam   Constitutional: She is oriented to person, place, and time. She appears well-developed.   HENT:   Head: Normocephalic and atraumatic.   Eyes: Conjunctivae are normal. No scleral icterus.   Neck: Neck supple. No thyromegaly present.   Cardiovascular: Normal rate and regular rhythm.   Pulmonary/Chest: Effort normal and breath sounds normal. Right breast exhibits no inverted nipple, no mass, no nipple discharge, no skin change and no tenderness. Left " breast exhibits no inverted nipple, no mass, no nipple discharge, no skin change and no tenderness.   Abdominal: Soft. Normal appearance and bowel sounds are normal. She exhibits no distension and no mass. There is no abdominal tenderness. There is no rebound and no guarding. No hernia.   Genitourinary:    Pelvic exam was performed with patient supine.   There is no rash, tenderness or lesion on the right labia. There is no rash, tenderness or lesion on the left labia. Uterus is not deviated, not enlarged, not fixed and not tender. Cervix exhibits no motion tenderness, no discharge and no friability. Right adnexum displays no mass, no tenderness and no fullness. Left adnexum displays no mass, no tenderness and no fullness.    No vaginal discharge, erythema, tenderness or bleeding.   No erythema, tenderness or bleeding in the vagina.    No foreign body in the vagina.      No signs of injury in the vagina.      Genitourinary Comments: IUD string not seen  No LPS noted     Neurological: She is alert and oriented to person, place, and time.   Skin: Skin is warm and dry.   Psychiatric: Her behavior is normal. Mood, judgment and thought content normal.   Nursing note and vitals reviewed.           Assessment/Plan    1) GYN HM: pap/G/C/T  SBE demonstrated and encouraged.  2) STD screening: declines Condoms encouraged.  3) Contraception: Malaika  10/2020- string not seen but has had confirmatory US.   4) Family Planning: family planning: no plans at present, encourage folic acid daily  5) Diet and Exercise discussed  6) Smoking Status: No  7) Social: ,no issues  8) MMG- plan age 42  9)Parts of this document have been copied or forwarded from her previous visits and have been reviewed, updated and edited as indicated.   10)I saw the patient with a face mask, gloves and eye protection  The patient herself was masked.  Social distancing was observed as appropriate.  11)Follow up prn or 1 year       Diagnoses and all  orders for this visit:    1. Pap smear, low-risk (Primary)  -     IGP,CtNgTv,rfx Aptima HPV ASCU    2. Routine gynecological examination  -     POC Urinalysis Dipstick  -     POC Pregnancy, Urine  -     IGP,CtNgTv,rfx Aptima HPV ASCU    3. IUD contraception          Ashlie Moses MD  3/3/2021    10:23 EST

## 2021-03-08 LAB
C TRACH RRNA CVX QL NAA+PROBE: NEGATIVE
CONV .: NORMAL
CYTOLOGIST CVX/VAG CYTO: NORMAL
CYTOLOGY CVX/VAG DOC CYTO: NORMAL
CYTOLOGY CVX/VAG DOC THIN PREP: NORMAL
DX ICD CODE: NORMAL
HIV 1 & 2 AB SER-IMP: NORMAL
Lab: NORMAL
N GONORRHOEA RRNA CVX QL NAA+PROBE: NEGATIVE
OTHER STN SPEC: NORMAL
STAT OF ADQ CVX/VAG CYTO-IMP: NORMAL
T VAGINALIS RRNA SPEC QL NAA+PROBE: NEGATIVE

## 2021-04-16 ENCOUNTER — BULK ORDERING (OUTPATIENT)
Dept: CASE MANAGEMENT | Facility: OTHER | Age: 26
End: 2021-04-16

## 2021-04-16 DIAGNOSIS — Z23 IMMUNIZATION DUE: ICD-10-CM

## 2021-04-24 NOTE — TELEPHONE ENCOUNTER
Tried contacting the patient via cell phone twice but the pt does not have a VM set up. I contacted the patients mother Nolvia, who is on her emergency form. Relayed the msg to the patients mother regarding a GYN appt with Ashlie Moses on 8/9/2017 @ 11am. This appt was the only available before the pt goes back to college on 8/12/2017.   
Attending Attestation (For Attendings USE Only)...

## 2022-03-09 ENCOUNTER — OFFICE VISIT (OUTPATIENT)
Dept: OBSTETRICS AND GYNECOLOGY | Facility: CLINIC | Age: 27
End: 2022-03-09

## 2022-03-09 VITALS
WEIGHT: 140.4 LBS | SYSTOLIC BLOOD PRESSURE: 128 MMHG | BODY MASS INDEX: 25.83 KG/M2 | DIASTOLIC BLOOD PRESSURE: 62 MMHG | HEIGHT: 62 IN

## 2022-03-09 DIAGNOSIS — Z01.419 ROUTINE GYNECOLOGICAL EXAMINATION: ICD-10-CM

## 2022-03-09 DIAGNOSIS — Z01.419 PAP SMEAR, LOW-RISK: Primary | ICD-10-CM

## 2022-03-09 DIAGNOSIS — Z13.89 SCREENING FOR GENITOURINARY CONDITION: ICD-10-CM

## 2022-03-09 LAB
B-HCG UR QL: NEGATIVE
BILIRUB BLD-MCNC: NEGATIVE MG/DL
CLARITY, POC: CLEAR
COLOR UR: YELLOW
EXPIRATION DATE: NORMAL
GLUCOSE UR STRIP-MCNC: NEGATIVE MG/DL
INTERNAL NEGATIVE CONTROL: NEGATIVE
INTERNAL POSITIVE CONTROL: POSITIVE
KETONES UR QL: NEGATIVE
LEUKOCYTE EST, POC: NEGATIVE
Lab: NORMAL
NITRITE UR-MCNC: NEGATIVE MG/ML
PH UR: 5 [PH] (ref 5–8)
PROT UR STRIP-MCNC: NEGATIVE MG/DL
RBC # UR STRIP: NEGATIVE /UL
SP GR UR: 1.02 (ref 1–1.03)
UROBILINOGEN UR QL: NORMAL

## 2022-03-09 PROCEDURE — 99395 PREV VISIT EST AGE 18-39: CPT | Performed by: OBSTETRICS & GYNECOLOGY

## 2022-03-09 PROCEDURE — 81002 URINALYSIS NONAUTO W/O SCOPE: CPT | Performed by: OBSTETRICS & GYNECOLOGY

## 2022-03-09 PROCEDURE — 81025 URINE PREGNANCY TEST: CPT | Performed by: OBSTETRICS & GYNECOLOGY

## 2022-03-09 NOTE — PROGRESS NOTES
GYN Annual Exam     CC- Here for annual exam.     Vernell Adhikari is a 26 y.o. female established patient who presents for annual well woman exam.  She had a Malaika IUD placed in 10/2020 and is not having much of a cycle.her strings were not visible but she had an US in 2020 confirming placement.  She is happy with her IUD. She is getting a divorce.     OB History        0    Para   0    Term   0       0    AB   0    Living   0       SAB   0    IAB   0    Ectopic   0    Molar        Multiple   0    Live Births              Obstetric Comments   No plans             Menarche: 17  Current contraception: Malaika placed 10/2020  History of abnormal Pap smear: no  History of abnormal mammogram: no  Family history of uterine, colon or ovarian cancer: no  Family history of breast cancer: no  H/o STDs: none  Last pap: 3/2021- nl pap  Gardasil:completed  ELIAZAR: PGF- DVT    Health Maintenance   Topic Date Due   • HEPATITIS C SCREENING  Never done   • ANNUAL PHYSICAL  2020   • COVID-19 Vaccine (3 - Booster for Moderna series) 2021   • INFLUENZA VACCINE  2021   • CHLAMYDIA SCREENING  2022   • Annual Gynecologic Pelvic and Breast Exam  2022   • PAP SMEAR  2023   • TDAP/TD VACCINES (3 - Td or Tdap) 10/23/2029   • HPV VACCINES  Completed   • Pneumococcal Vaccine 0-64  Aged Out       Past Medical History:   Diagnosis Date   • Joint pain     or swelling       Past Surgical History:   Procedure Laterality Date   • CYSTECTOMY  2015    cyst removal on left foot    • WISDOM TOOTH EXTRACTION           Current Outpatient Medications:   •  Levonorgestrel (Malaika) 13.5 MG intrauterine device IUD, 1 each by Intrauterine route 1 (One) Time., Disp: , Rfl:     No Known Allergies    Social History     Tobacco Use   • Smoking status: Never Smoker   • Smokeless tobacco: Never Used   Substance Use Topics   • Alcohol use: No   • Drug use: No       Family History   Problem Relation Age of Onset   •  "Hypertension Other    • Heart disease Other    • Hypertension Mother    • Thyroid disease Mother    • Depression Mother    • Anxiety disorder Mother    • Heart disease Maternal Grandmother    • Anxiety disorder Maternal Grandfather    • Deep vein thrombosis Paternal Grandfather    • Breast cancer Neg Hx    • Ovarian cancer Neg Hx    • Colon cancer Neg Hx    • Pulmonary embolism Neg Hx    • Birth defects Neg Hx    • Mental retardation Neg Hx        Review of Systems   Constitutional: Positive for activity change. Negative for appetite change, fatigue, fever and unexpected weight change.   Eyes: Negative for photophobia and visual disturbance.   Respiratory: Negative for cough and shortness of breath.    Cardiovascular: Negative for chest pain and palpitations.   Gastrointestinal: Negative for abdominal distention, abdominal pain, constipation, diarrhea and nausea.   Endocrine: Negative for cold intolerance and heat intolerance.   Genitourinary: Negative for decreased urine volume, dyspareunia, dysuria, menstrual problem, pelvic pain, vaginal bleeding and vaginal discharge.   Musculoskeletal: Negative for back pain.   Skin: Negative for color change and rash.   Neurological: Negative for headaches.   Hematological: Negative for adenopathy. Does not bruise/bleed easily.   Psychiatric/Behavioral: Negative for dysphoric mood. The patient is not nervous/anxious.        /62   Ht 157.5 cm (62\")   Wt 63.7 kg (140 lb 6.4 oz)   BMI 25.68 kg/m²     Physical Exam   Constitutional: She is oriented to person, place, and time. She appears well-developed.   HENT:   Head: Normocephalic and atraumatic.   Eyes: Conjunctivae are normal. No scleral icterus.   Neck: No thyromegaly present.   Cardiovascular: Normal rate and regular rhythm.   Pulmonary/Chest: Effort normal and breath sounds normal. Right breast exhibits no inverted nipple, no mass, no nipple discharge, no skin change and no tenderness. Left breast exhibits no " inverted nipple, no mass, no nipple discharge, no skin change and no tenderness.   Abdominal: Soft. Normal appearance and bowel sounds are normal. She exhibits no distension and no mass. There is no abdominal tenderness. There is no rebound and no guarding. No hernia.   Genitourinary:    Pelvic exam was performed with patient supine.   There is no rash, tenderness or lesion on the right labia. There is no rash, tenderness or lesion on the left labia. Uterus is not deviated, not enlarged, not fixed and not tender. Cervix exhibits no motion tenderness, no discharge and no friability. Right adnexum displays no mass, no tenderness and no fullness. Left adnexum displays no mass, no tenderness and no fullness.    Vaginal bleeding present.      No vaginal discharge, erythema or tenderness.   There is bleeding in the vagina. No erythema or tenderness in the vagina.    No foreign body in the vagina.      No signs of injury in the vagina.      Genitourinary Comments: IUD string not seen  No LPS noted     Neurological: She is alert and oriented to person, place, and time.   Skin: Skin is warm and dry.   Psychiatric: Her behavior is normal. Mood, judgment and thought content normal.   Nursing note and vitals reviewed.                 Assessment/Plan    1) GYN HM: pap/G/C/T  SBE demonstrated and encouraged.  2) STD screening: declines Condoms encouraged.  3) Contraception: Malaika  10/2020- string not seen but has had confirmatory US.   4) Family Planning: family planning: no plans at present, encourage folic acid daily  5) Diet and Exercise discussed  6) Smoking Status: No  7) Social: getting , DV screen negative  8) MMG- plan age 42  9)Parts of this document have been copied or forwarded from her previous visits and have been reviewed, updated and edited as indicated.   10)I saw the patient with a face mask, gloves and eye protection  The patient herself was masked.  Social distancing was observed as  appropriate.  11)Follow up prn or 1 year annual        Diagnoses and all orders for this visit:    1. Pap smear, low-risk (Primary)  -     IGP,CtNgTv,rfx Aptima HPV ASCU    2. Screening for genitourinary condition  -     POC Urinalysis Dipstick  -     POC Pregnancy, Urine    3. Routine gynecological examination  -     IGP,CtNgTv,rfx Aptima HPV ASCU          Ashlie Moses MD  3/9/2022    13:06 EST

## 2022-09-15 ENCOUNTER — HOSPITAL ENCOUNTER (OUTPATIENT)
Dept: GENERAL RADIOLOGY | Facility: HOSPITAL | Age: 27
Discharge: HOME | End: 2022-09-15
Admitting: NURSE PRACTITIONER

## 2022-09-15 ENCOUNTER — OFFICE VISIT (OUTPATIENT)
Dept: INTERNAL MEDICINE | Facility: CLINIC | Age: 27
End: 2022-09-15

## 2022-09-15 VITALS
DIASTOLIC BLOOD PRESSURE: 68 MMHG | WEIGHT: 141 LBS | BODY MASS INDEX: 25.95 KG/M2 | OXYGEN SATURATION: 97 % | HEART RATE: 74 BPM | HEIGHT: 62 IN | SYSTOLIC BLOOD PRESSURE: 112 MMHG

## 2022-09-15 DIAGNOSIS — G89.29 CHRONIC RIGHT SHOULDER PAIN: Primary | ICD-10-CM

## 2022-09-15 DIAGNOSIS — M25.511 CHRONIC RIGHT SHOULDER PAIN: ICD-10-CM

## 2022-09-15 DIAGNOSIS — M25.511 CHRONIC RIGHT SHOULDER PAIN: Primary | ICD-10-CM

## 2022-09-15 DIAGNOSIS — G89.29 CHRONIC RIGHT SHOULDER PAIN: ICD-10-CM

## 2022-09-15 PROCEDURE — 99213 OFFICE O/P EST LOW 20 MIN: CPT | Performed by: NURSE PRACTITIONER

## 2022-09-15 PROCEDURE — 73030 X-RAY EXAM OF SHOULDER: CPT

## 2022-09-15 NOTE — PROGRESS NOTES
Subjective   Vernell Ornelas is a 27 y.o. female. Patient is here today for   Chief Complaint   Patient presents with   • Shoulder Pain     Pt complains of having right shoulder pain for several months.    .    History of Present Illness   C/o right shoulder pain for several months. No specific injury, but she does exercise regularly.  When her shoulder starts to bother her, she rests and gets better.  Then will flareup again.  Denies any numbness or tingling in her hand or arm.  She has not really taken any medication for it.      The following portions of the patient's history were reviewed and updated as appropriate: allergies, current medications, past family history, past medical history, past social history, past surgical history and problem list.    Review of Systems    Objective   Vitals:    09/15/22 1310   BP: 112/68   Pulse: 74   SpO2: 97%     Body mass index is 25.79 kg/m².  Physical Exam  Vitals and nursing note reviewed.   Constitutional:       Appearance: She is well-developed.   Cardiovascular:      Rate and Rhythm: Normal rate and regular rhythm.      Heart sounds: Normal heart sounds.   Pulmonary:      Effort: Pulmonary effort is normal.      Breath sounds: Normal breath sounds.   Musculoskeletal:      Right shoulder: Tenderness present. Normal range of motion.   Skin:     General: Skin is warm and dry.   Psychiatric:         Speech: Speech normal.         Behavior: Behavior normal.         Thought Content: Thought content normal.         Assessment & Plan   Diagnoses and all orders for this visit:    1. Chronic right shoulder pain (Primary)  -     XR shoulder 2+ vw right; Future  -     Ambulatory Referral to Orthopedic Surgery      Will check an x-ray and refer to ortho. She may benefit from PT.

## 2022-09-30 ENCOUNTER — OFFICE VISIT (OUTPATIENT)
Dept: ORTHOPEDIC SURGERY | Facility: CLINIC | Age: 27
End: 2022-09-30

## 2022-09-30 VITALS — TEMPERATURE: 97.3 F | HEIGHT: 62 IN | BODY MASS INDEX: 26.35 KG/M2 | WEIGHT: 143.2 LBS

## 2022-09-30 DIAGNOSIS — S43.431A TEAR OF RIGHT GLENOID LABRUM, INITIAL ENCOUNTER: Primary | ICD-10-CM

## 2022-09-30 PROCEDURE — 99213 OFFICE O/P EST LOW 20 MIN: CPT | Performed by: ORTHOPAEDIC SURGERY

## 2022-09-30 RX ORDER — MELOXICAM 15 MG/1
TABLET ORAL
Qty: 30 TABLET | Refills: 0 | Status: SHIPPED | OUTPATIENT
Start: 2022-09-30 | End: 2022-09-30 | Stop reason: SDUPTHER

## 2022-09-30 NOTE — PROGRESS NOTES
New Right Shoulder      Patient: Vernell Ornelas        YOB: 1995    Medical Record Number: [id number]        Chief Complaints: Right shoulder pain      History of Present Illness: This is a 27-year-old female is right-hand dominant presents right shoulder pain is been ongoing for years much worse recently now she has popping and marked limitation of her function.  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  She has no night pain not 1 particular injury or event but if she is active her shoulder hurts.  Her pain is primarily anterior.  She has done ice heat strengthening and stretching all with no lasting symptoms are moderate intermittent aching worse with activity somewhat better with rest past medical history is unremarkable      Allergies: No Known Allergies    Medications:   Home Medications:  Current Outpatient Medications on File Prior to Visit   Medication Sig   • Levonorgestrel (Malaika) 13.5 MG intrauterine device IUD 1 each by Intrauterine route 1 (One) Time.     No current facility-administered medications on file prior to visit.     Current Medications:  Scheduled Meds:  Continuous Infusions:No current facility-administered medications for this visit.    PRN Meds:.    Past Medical History:   Diagnosis Date   • Joint pain     or swelling        Past Surgical History:   Procedure Laterality Date   • CYSTECTOMY  07/2015    cyst removal on left foot    • WISDOM TOOTH EXTRACTION          Social History     Occupational History   • Not on file   Tobacco Use   • Smoking status: Never Smoker   • Smokeless tobacco: Never Used   Vaping Use   • Vaping Use: Not on file   Substance and Sexual Activity   • Alcohol use: No   • Drug use: No   • Sexual activity: Yes     Partners: Male     Birth control/protection: I.U.D.     Comment: Malaika 10/2020      Social History     Social History Narrative   • Not on file        Family History   Problem Relation  Age of Onset   • Hypertension Other    • Heart disease Other    • Hypertension Mother    • Thyroid disease Mother    • Depression Mother    • Anxiety disorder Mother    • Heart disease Maternal Grandmother    • Anxiety disorder Maternal Grandfather    • Deep vein thrombosis Paternal Grandfather    • Breast cancer Neg Hx    • Ovarian cancer Neg Hx    • Colon cancer Neg Hx    • Pulmonary embolism Neg Hx    • Birth defects Neg Hx    • Mental retardation Neg Hx              Review of Systems:     Review of Systems      Physical Exam: 27 y.o. female  General Appearance:    Alert, cooperative, in no acute distress                 There were no vitals filed for this visit.   Patient is alert and read ×3 no acute distress appears her above-listed at height weight and age.  Affect is normal respiratory rate is normal unlabored. Heart rate regular rate rhythm, sclera, dentition and hearing are normal for the purpose of this exam.    Ortho Exam Physical exam of the right shoulder reveals no overlying skin changes no lymphedema no lymphadenopathy.  Patient has active flexion 180 with mild symptoms abduction is similar external rotation is to 50 and internal rotation to the upper lumbar spine with mild symptoms.  Patient has good rotator cuff strength 4+ over 5 with isometric strength testing with pain.  Patient has a positive impingement and a positive Carrillo sign.  Patient has good cervical range of motion which is full and asymptomatic no radicular symptoms.  Patient has a normal elbow exam.  Good distal pulses are present  Patient has pain with overhead activity and a positive Neer sign and a positive empty can sign , a positive drop arm and a definitive painful arc  I do get pain with stressing of the biceps anchor        Radiology:   AP, Scapular Y and Axillary Lateral of the right shoulder were /reviewed to evauate shoulder pain.  I have no comparative films these are normal I see no evidence of any acute bony  pathology  Imaging Results (Most Recent)     None        Assessment/Plan: Right shoulder pain rotator cuff is in my differential but probably more likely I think this could be SLAP pathology based on her exam based on her long history of gymnastics and her current complaints.  She is done stretching strengthening rest ice heat all with no lasting improvement plan is to proceed with an MRI.  In view the fact I am considering labral pathology I will do the MRI with and or

## 2022-10-03 RX ORDER — MELOXICAM 15 MG/1
TABLET ORAL
Qty: 30 TABLET | Refills: 0 | Status: SHIPPED | OUTPATIENT
Start: 2022-10-03

## 2022-10-04 ENCOUNTER — TELEPHONE (OUTPATIENT)
Dept: ORTHOPEDIC SURGERY | Facility: CLINIC | Age: 27
End: 2022-10-04

## 2022-10-04 RX ORDER — MELOXICAM 15 MG/1
TABLET ORAL
Qty: 30 TABLET | Refills: 0 | Status: SHIPPED | OUTPATIENT
Start: 2022-10-04 | End: 2022-10-27

## 2022-10-04 NOTE — TELEPHONE ENCOUNTER
Caller: BROOK     Relationship to patient: SELF     Best call back number: [phone]    Patient is needing: PT CALLED IN TO CHECK ON HER MELOXICAM SCRIPT , STATES THE PHARMACY HAS NOT RECEIVED IT YET. PLEASE REVIEW AND ADVISE

## 2022-10-26 ENCOUNTER — APPOINTMENT (OUTPATIENT)
Dept: GENERAL RADIOLOGY | Facility: HOSPITAL | Age: 27
End: 2022-10-26

## 2022-10-26 ENCOUNTER — HOSPITAL ENCOUNTER (OUTPATIENT)
Dept: MRI IMAGING | Facility: HOSPITAL | Age: 27
Discharge: HOME | End: 2022-10-26

## 2022-10-26 ENCOUNTER — TELEPHONE (OUTPATIENT)
Dept: INTERNAL MEDICINE | Facility: CLINIC | Age: 27
End: 2022-10-26

## 2022-10-26 ENCOUNTER — HOSPITAL ENCOUNTER (OUTPATIENT)
Dept: GENERAL RADIOLOGY | Facility: HOSPITAL | Age: 27
Discharge: HOME | End: 2022-10-26

## 2022-10-26 DIAGNOSIS — S43.431A TEAR OF RIGHT GLENOID LABRUM, INITIAL ENCOUNTER: ICD-10-CM

## 2022-10-26 PROCEDURE — 77002 NEEDLE LOCALIZATION BY XRAY: CPT

## 2022-10-26 PROCEDURE — 25010000002 IOPAMIDOL 61 % SOLUTION: Performed by: ORTHOPAEDIC SURGERY

## 2022-10-26 PROCEDURE — 73222 MRI JOINT UPR EXTREM W/DYE: CPT

## 2022-10-26 PROCEDURE — 0 LIDOCAINE 1 % SOLUTION: Performed by: ORTHOPAEDIC SURGERY

## 2022-10-26 RX ORDER — LIDOCAINE HYDROCHLORIDE 10 MG/ML
10 INJECTION, SOLUTION INFILTRATION; PERINEURAL
Status: COMPLETED | OUTPATIENT
Start: 2022-10-26 | End: 2022-10-26

## 2022-10-26 RX ADMIN — IOPAMIDOL 3 ML: 612 INJECTION, SOLUTION INTRAVENOUS at 09:55

## 2022-10-26 RX ADMIN — LIDOCAINE HYDROCHLORIDE 2 ML: 10 INJECTION, SOLUTION INFILTRATION; PERINEURAL at 09:56

## 2022-10-26 NOTE — TELEPHONE ENCOUNTER
Caller: Vernell Ornelas    Relationship: Self    Best call back number: [phone]    What orders are you requesting (i.e. lab or imaging): URINE ANALYSIS     In what timeframe would the patient need to come in: ASAP    Where will you receive your lab/imaging services: OFFICE    Additional notes: PATIENT SAID SINCE LAST Thursday SHE HAS HAD PAIN WITH URINATION AND SOME BLOOD IN HER URINE AS WELL AS AN URGENCY TO URINATE

## 2022-10-27 ENCOUNTER — OFFICE VISIT (OUTPATIENT)
Dept: INTERNAL MEDICINE | Facility: CLINIC | Age: 27
End: 2022-10-27

## 2022-10-27 VITALS
SYSTOLIC BLOOD PRESSURE: 102 MMHG | HEART RATE: 70 BPM | OXYGEN SATURATION: 100 % | DIASTOLIC BLOOD PRESSURE: 62 MMHG | BODY MASS INDEX: 27.33 KG/M2 | TEMPERATURE: 98 F | WEIGHT: 148.5 LBS | HEIGHT: 62 IN

## 2022-10-27 DIAGNOSIS — R30.0 DYSURIA: Primary | ICD-10-CM

## 2022-10-27 LAB
BILIRUB BLD-MCNC: NEGATIVE MG/DL
CLARITY, POC: ABNORMAL
COLOR UR: ABNORMAL
EXPIRATION DATE: ABNORMAL
GLUCOSE UR STRIP-MCNC: NEGATIVE MG/DL
KETONES UR QL: NEGATIVE
LEUKOCYTE EST, POC: ABNORMAL
Lab: ABNORMAL
NITRITE UR-MCNC: NEGATIVE MG/ML
PH UR: 6.5 [PH] (ref 5–8)
PROT UR STRIP-MCNC: ABNORMAL MG/DL
RBC # UR STRIP: ABNORMAL /UL
SP GR UR: 1.01 (ref 1–1.03)
UROBILINOGEN UR QL: NORMAL

## 2022-10-27 PROCEDURE — 99213 OFFICE O/P EST LOW 20 MIN: CPT | Performed by: NURSE PRACTITIONER

## 2022-10-27 PROCEDURE — 81003 URINALYSIS AUTO W/O SCOPE: CPT | Performed by: NURSE PRACTITIONER

## 2022-10-27 RX ORDER — CIPROFLOXACIN 250 MG/1
250 TABLET, FILM COATED ORAL 2 TIMES DAILY
Qty: 10 TABLET | Refills: 0 | Status: SHIPPED | OUTPATIENT
Start: 2022-10-27

## 2022-10-27 NOTE — PROGRESS NOTES
Subjective   Vernell Ornelas is a 27 y.o. female. Patient is here today for   Chief Complaint   Patient presents with   • Difficulty Urinating     7 days    .    History of Present Illness   C/o burning with urination for about a week associated with frequency, urgency. She increased her water intake  She is having some nausea and flank pain    The following portions of the patient's history were reviewed and updated as appropriate: allergies, current medications, past family history, past medical history, past social history, past surgical history and problem list.    Review of Systems    Objective   Vitals:    10/27/22 0753   BP: 102/62   Pulse: 70   Temp: 98 °F (36.7 °C)   SpO2: 100%     Body mass index is 27.15 kg/m².  Physical Exam  Vitals and nursing note reviewed.   Constitutional:       Appearance: Normal appearance. She is well-developed.   Cardiovascular:      Rate and Rhythm: Normal rate and regular rhythm.      Heart sounds: Normal heart sounds.   Pulmonary:      Effort: Pulmonary effort is normal.      Breath sounds: Normal breath sounds.   Skin:     General: Skin is warm and dry.   Neurological:      Mental Status: She is alert and oriented to person, place, and time.   Psychiatric:         Speech: Speech normal.         Behavior: Behavior normal.         Thought Content: Thought content normal.       Results for orders placed or performed in visit on 10/27/22   POCT urinalysis dipstick, automated    Specimen: Urine   Result Value Ref Range    Color Almita Yellow, Straw, Dark Yellow, Almita    Clarity, UA Turbid (A) Clear    Specific Gravity  1.015 1.005 - 1.030    pH, Urine 6.5 5.0 - 8.0    Leukocytes Large (3+) (A) Negative    Nitrite, UA Negative Negative    Protein, POC Trace (A) Negative mg/dL    Glucose, UA Negative Negative mg/dL    Ketones, UA Negative Negative    Urobilinogen, UA Normal Normal, 0.2 E.U./dL    Bilirubin Negative Negative    Blood, UA 3+ (A) Negative    Lot Number [id number]      Expiration Date 7/31/2023        Assessment & Plan   Diagnoses and all orders for this visit:    1. Dysuria (Primary)  -     ciprofloxacin (Cipro) 250 MG tablet; Take 1 tablet by mouth 2 (Two) Times a Day.  Dispense: 10 tablet; Refill: 0  -     POCT urinalysis dipstick, automated  -     Urine Culture - Urine, Urine, Clean Catch      Patient will increase water intake. Will call with culture results.

## 2022-10-28 ENCOUNTER — TELEPHONE (OUTPATIENT)
Dept: ORTHOPEDIC SURGERY | Facility: CLINIC | Age: 27
End: 2022-10-28

## 2022-10-28 NOTE — TELEPHONE ENCOUNTER
----- Message from Pastora Underwood MD sent at 10/27/2022  4:47 PM EDT -----  Please tell her she has little bit of arthritis at her acromioclavicular joint and has some tendinitis of the rotator cuff no significant tearing nothing that looks like it needs to be for

## 2022-11-01 LAB
BACTERIA UR CULT: ABNORMAL
BACTERIA UR CULT: ABNORMAL
OTHER ANTIBIOTIC SUSC ISLT: ABNORMAL

## 2022-11-02 ENCOUNTER — TELEPHONE (OUTPATIENT)
Dept: INTERNAL MEDICINE | Facility: CLINIC | Age: 27
End: 2022-11-02

## 2022-11-02 NOTE — TELEPHONE ENCOUNTER
Okay for hub to read:  Called patient to discuss lab results, if patient calls back please warm transfer.

## 2022-11-11 ENCOUNTER — OFFICE VISIT (OUTPATIENT)
Dept: ORTHOPEDIC SURGERY | Facility: CLINIC | Age: 27
End: 2022-11-11

## 2022-11-11 VITALS — WEIGHT: 145 LBS | HEIGHT: 62 IN | BODY MASS INDEX: 26.68 KG/M2

## 2022-11-11 DIAGNOSIS — M75.41 IMPINGEMENT SYNDROME OF RIGHT SHOULDER: Primary | ICD-10-CM

## 2022-11-11 PROCEDURE — 99213 OFFICE O/P EST LOW 20 MIN: CPT | Performed by: ORTHOPAEDIC SURGERY

## 2022-11-11 NOTE — PROGRESS NOTES
"Shoulder MRI Follow Up      Patient: Vernell Ornelas        YOB: 1995            Chief Complaints: Shoulder pain right      History of Present Illness: The patient is here follow-up of an MRI of the shoulder MRI demonstrates some tendinopathy of the rotator cuff some bursitis she is a small partial-thickness tear involving a very small amount of the tendon she does have some acromioclavicular arthritis.  Her symptoms seem to be more related to the cuff anteriorly      Physical Exam: 27 y.o. female  General Appearance:    Alert, cooperative, in no acute distress                   Vitals:    11/11/22 0818   Weight: 65.8 kg (145 lb)   Height: 157.5 cm (62\")        Patient is alert and read ×3 no acute distress appears her above-listed at height weight and age.  Affect is normal respiratory rate is normal unlabored. Heart rate regular rate rhythm, sclera, dentition and hearing are normal for the purpose of this exam.      Ortho Exam Physical exam of the right shoulder reveals no overlying skin changes no lymphedema no lymphadenopathy.  Patient has active flexion 180 with mild symptoms abduction is similar external rotation is to 50 and internal rotation to the upper lumbar spine with mild symptoms.  Patient has good rotator cuff strength 4+ over 5 with isometric strength testing with pain.  Patient has a positive impingement and a positive Carrillo sign.  Patient has good cervical range of motion which is full and asymptomatic no radicular symptoms.  Patient has a normal elbow exam.  Good distal pulses are present  Patient has pain with overhead activity and a positive Neer sign and a positive empty can sign , a positive drop arm and a definitive painful arc      MRI Results: MRIs as above have reviewed the films myself and agree with the findings  Procedures      Assessment/Plan: Right shoulder pain I think this is some impingement I really would like to inject that she wants to hold off on that do " the other conservative things first.  She has not seen physical therapy and I do think that will be important as well we will have her see therapy working on cuff and core I will see her back in 1 month if she is not appreciably better would consider a subacromial injection she does have meloxicam at home and will get back on the

## 2024-02-09 ENCOUNTER — OFFICE VISIT (OUTPATIENT)
Dept: INTERNAL MEDICINE | Facility: CLINIC | Age: 29
End: 2024-02-09
Payer: OTHER GOVERNMENT

## 2024-02-09 VITALS
HEIGHT: 62 IN | BODY MASS INDEX: 25.1 KG/M2 | TEMPERATURE: 98 F | OXYGEN SATURATION: 99 % | DIASTOLIC BLOOD PRESSURE: 70 MMHG | SYSTOLIC BLOOD PRESSURE: 110 MMHG | WEIGHT: 136.4 LBS | HEART RATE: 70 BPM

## 2024-02-09 DIAGNOSIS — Z00.00 HEALTHCARE MAINTENANCE: Primary | ICD-10-CM

## 2024-02-09 DIAGNOSIS — M79.601 PAIN OF RIGHT UPPER EXTREMITY: ICD-10-CM

## 2024-02-09 DIAGNOSIS — R22.31 ARM MASS, RIGHT: ICD-10-CM

## 2024-02-09 DIAGNOSIS — M25.561 ACUTE PAIN OF RIGHT KNEE: ICD-10-CM

## 2024-02-09 NOTE — PROGRESS NOTES
"Subjective   Vernell Ornelas is a 28 y.o. female and is here for a comprehensive physical exam. The patient reports  :  C/o right arm pain. She had a hard mass in her upper arm that has improved. She has pain in her arm. No N/T. Hurts with certain movements. No meds tried.     C/o right knee pain when she has started running. Walking up and down stairs hurts. Pain improves with rest. No recent injury.     Do you take any herbs or supplements that were not prescribed by a doctor?  creatine     History:  LMP: No LMP recorded. Patient has had an implant.  Has an appt in June/July    The following portions of the patient's history were reviewed and updated as appropriate: allergies, current medications, past family history, past medical history, past social history, past surgical history and problem list.    Review of Systems  Do you have pain that bothers you in your daily life? no  Pertinent items are noted in HPI.    Objective   /70   Pulse 70   Temp 98 °F (36.7 °C)   Ht 157.5 cm (62.01\")   Wt 61.9 kg (136 lb 6.4 oz)   SpO2 99%   BMI 24.94 kg/m²     General Appearance:    Alert, cooperative, no distress, appears stated age   Head:    Normocephalic, without obvious abnormality, atraumatic   Eyes:    PERRL, conjunctiva/corneas clear, EOM's intact, both eyes   Ears:    Normal TM's and external ear canals, both ears   Nose:   Nares normal, septum midline, mucosa normal, no drainage    or sinus tenderness   Throat:   Lips, mucosa, and tongue normal; teeth and gums normal   Neck:   Supple, symmetrical, trachea midline, no adenopathy;     thyroid:  no enlargement/tenderness/nodules; no carotid    bruit   Back:     Symmetric, no curvature, ROM normal, no CVA tenderness   Lungs:     Clear to auscultation bilaterally, respirations unlabored   Chest Wall:    No tenderness or deformity    Heart:    Regular rate and rhythm, S1 and S2 normal, no murmur       Abdomen:     Soft, non-tender, bowel sounds active all " four quadrants,     no masses, no organomegaly           Extremities:   Extremities normal, atraumatic, no cyanosis or edema; right upper inner arm pain; right knee pain with palpation of median side of knee   Pulses:   2+ and symmetric all extremities   Skin:   Skin color, texture, turgor normal, no rashes or lesions   Lymph nodes:   Cervical, supraclavicular, and axillary nodes normal   Neurologic:   Grossly intact, normal strength, sensation and reflexes     throughout        Assessment & Plan   Healthy female exam.      1. Diagnoses and all orders for this visit:    1. Healthcare maintenance (Primary)  -     CBC (No Diff); Future  -     Comprehensive Metabolic Panel; Future  -     Lipid Panel With / Chol / HDL Ratio; Future  -     TSH; Future  -     Vitamin D,25-Hydroxy; Future    2. Acute pain of right knee  -     XR Knee 3 View Right; Future    3. Pain of right upper extremity  -     US Nonvascular Extremity Limited; Future    4. Arm mass, right  -     US Nonvascular Extremity Limited; Future      Patient will return for fasting labs.     Imaging ordered as noted above. May need a referral to ortho if her knee pain doesn't improve.     2. Patient Counseling:  --Nutrition: Stressed importance of moderation in sodium/caffeine intake, saturated fat and cholesterol, caloric balance, sufficient intake of fresh fruits, vegetables, fiber, calcium, iron.  Vegetarian, but will occasionally eat meat  --Exercise: Stressed the importance of regular exercise.   --Dental health: Discussed importance of regular tooth brushing, flossing, and dental visits.  --Immunizations reviewed.    3. Discussed the patient's BMI with her.  The BMI is in the acceptable range  BMI is within normal parameters. No other follow-up for BMI required.     4. Follow up next physical in 1 year

## 2024-02-13 ENCOUNTER — HOSPITAL ENCOUNTER (OUTPATIENT)
Dept: GENERAL RADIOLOGY | Facility: HOSPITAL | Age: 29
Discharge: HOME | End: 2024-02-13
Admitting: NURSE PRACTITIONER
Payer: OTHER GOVERNMENT

## 2024-02-13 DIAGNOSIS — M25.561 ACUTE PAIN OF RIGHT KNEE: ICD-10-CM

## 2024-02-13 PROCEDURE — 73562 X-RAY EXAM OF KNEE 3: CPT

## 2024-02-15 ENCOUNTER — PATIENT MESSAGE (OUTPATIENT)
Dept: INTERNAL MEDICINE | Facility: CLINIC | Age: 29
End: 2024-02-15
Payer: OTHER GOVERNMENT

## 2024-02-15 DIAGNOSIS — M25.561 ACUTE PAIN OF RIGHT KNEE: Primary | ICD-10-CM

## 2024-02-19 ENCOUNTER — HOSPITAL ENCOUNTER (OUTPATIENT)
Dept: ULTRASOUND IMAGING | Facility: HOSPITAL | Age: 29
Discharge: HOME | End: 2024-02-19
Admitting: NURSE PRACTITIONER
Payer: OTHER GOVERNMENT

## 2024-02-19 DIAGNOSIS — M79.601 PAIN OF RIGHT UPPER EXTREMITY: ICD-10-CM

## 2024-02-19 DIAGNOSIS — R22.31 ARM MASS, RIGHT: ICD-10-CM

## 2024-02-19 PROCEDURE — 76882 US LMTD JT/FCL EVL NVASC XTR: CPT

## 2024-02-21 ENCOUNTER — OFFICE VISIT (OUTPATIENT)
Dept: SPORTS MEDICINE | Facility: CLINIC | Age: 29
End: 2024-02-21
Payer: OTHER GOVERNMENT

## 2024-02-21 VITALS
WEIGHT: 136 LBS | SYSTOLIC BLOOD PRESSURE: 100 MMHG | OXYGEN SATURATION: 99 % | HEART RATE: 71 BPM | RESPIRATION RATE: 16 BRPM | BODY MASS INDEX: 25.03 KG/M2 | DIASTOLIC BLOOD PRESSURE: 70 MMHG | HEIGHT: 62 IN

## 2024-02-21 DIAGNOSIS — M25.561 ACUTE PAIN OF RIGHT KNEE: Primary | ICD-10-CM

## 2024-02-21 DIAGNOSIS — M22.2X1 PATELLOFEMORAL PAIN SYNDROME OF RIGHT KNEE: ICD-10-CM

## 2024-02-21 RX ORDER — CELECOXIB 200 MG/1
200 CAPSULE ORAL 2 TIMES DAILY PRN
Qty: 30 CAPSULE | Refills: 0 | Status: SHIPPED | OUTPATIENT
Start: 2024-02-21

## 2024-02-21 NOTE — PROGRESS NOTES
"Vernell is a 28 y.o. year old female presents to [facility]    Chief Complaint   Patient presents with    Right Knee - Pain, Initial Evaluation     Referral from PCP for eval of RT knee pain - NKI, patient reported on 02/09/2024 pain when running, walking up and down stairs - better with rest - x-rays done on 02/13/2024 - here for further evaluation and treatment        History of Present Illness  Subacute right knee pain.  Onset December 2023.  She is active , [employer] and has PT test pending within the next 2 months.  She has been recovering from a left hip injury and as a result, has not been as active.  She has tried to return to running though she has been hampered due to her now right knee pain.  Pain is along the inside of the knee.  She has been under the care of PT for the past month regarding the right knee.  Associates pseudo instability.  She has been taking anti-inflammatory on an as-needed basis.  Has also intermittently been applying ice.    I have reviewed the patient's medical, family, and social history in detail and updated the computerized patient record.    /70 (BP Location: Right arm, Patient Position: Sitting, Cuff Size: Adult)   Pulse 71   Resp 16   Ht 157.5 cm (62.01\")   Wt 61.7 kg (136 lb)   SpO2 99%   BMI 24.87 kg/m²      Physical Exam    Vital signs reviewed.   General: No acute distress.  Eyes: conjunctiva clear; pupils equally round and reactive  ENT: external ears atraumatic  CV: no peripheral edema  Resp: normal respiratory effort, no use of accessory muscles  Skin: no rashes or wounds; normal turgor  Psych: mood and affect appropriate; recent and remote memory intact  Neuro: sensation to light touch intact    MSK Exam  R knee: No effusion.  Full range of motion.  Negative Lachman.  Negative medial, negative lateral Zayda.  Positive retropatellar crepitus.  Negative patella apprehension test.  No varus no valgus laxity.    XR Knee 3 " View Right (02/13/2024 08:03)   Reviewed independently.  Joint spaces well-maintained.  Soft tissues unremarkable.           Diagnoses and all orders for this visit:    Acute pain of right knee  -     MRI Knee Right Without Contrast; Future  -     celecoxib (CeleBREX) 200 MG capsule; Take 1 capsule by mouth 2 (Two) Times a Day As Needed for Mild Pain.    Patellofemoral pain syndrome of right knee  -     MRI Knee Right Without Contrast; Future  -     celecoxib (CeleBREX) 200 MG capsule; Take 1 capsule by mouth 2 (Two) Times a Day As Needed for Mild Pain.      Discussed differential of her medial knee pain.  She does have some symptoms consistent with patellofemoral pain but cannot rule out possibility for cartilage defect.  I recommend regular anti-inflammatory as well as continued work with PT.  However, I will also risk ratified further with MRI.  Follow-up in 4 weeks to discuss.          Follow Up     Return in about 4 weeks (around 3/20/2024) for Recheck.    Patient was given instructions and counseling regarding her condition or for health maintenance advice. Please see specific information pulled into the AVS if appropriate.     EMR Dragon/Transcription disclaimer:    Much of this encounter note is an electronic transcription/translation of spoken language to printed text.  The electronic translation of spoken language may permit erroneous, or at times, nonsensical words or phrases to be inadvertently transcribed.  Although I have reviewed the note for such errors some may still exist.

## 2024-02-22 ENCOUNTER — PATIENT ROUNDING (BHMG ONLY) (OUTPATIENT)
Dept: SPORTS MEDICINE | Facility: CLINIC | Age: 29
End: 2024-02-22
Payer: OTHER GOVERNMENT

## 2024-02-22 NOTE — PROGRESS NOTES
February 22, 2024    A [employer] Message has been sent to the patient for PATIENT ROUNDING with [facility]

## 2024-02-23 DIAGNOSIS — R22.31 ARM MASS, RIGHT: Primary | ICD-10-CM

## 2024-03-08 ENCOUNTER — TELEPHONE (OUTPATIENT)
Dept: SURGERY | Facility: CLINIC | Age: 29
End: 2024-03-08
Payer: OTHER GOVERNMENT

## 2024-03-08 NOTE — TELEPHONE ENCOUNTER
Voicemail left for patient that her appointment on 3/11 with Dr. Mendoza has been moved to 9:45 am. Requested patient call back to confirm.

## 2024-03-11 ENCOUNTER — OFFICE VISIT (OUTPATIENT)
Dept: SURGERY | Facility: CLINIC | Age: 29
End: 2024-03-11
Payer: OTHER GOVERNMENT

## 2024-03-11 VITALS
SYSTOLIC BLOOD PRESSURE: 116 MMHG | WEIGHT: 136 LBS | DIASTOLIC BLOOD PRESSURE: 76 MMHG | HEIGHT: 62 IN | BODY MASS INDEX: 25.03 KG/M2

## 2024-03-11 DIAGNOSIS — D21.3: Primary | ICD-10-CM

## 2024-03-11 PROCEDURE — 99204 OFFICE O/P NEW MOD 45 MIN: CPT | Performed by: SURGERY

## 2024-03-11 NOTE — PROGRESS NOTES
General Surgery Initial Office Visit    Referring Provider: JIMI Chicas    Chief Complaint:    Right axillary mass    History of Present Illness:    Vernell Ornelas is a 28 y.o. female who presents with a history of right upper arm mass and pain.  She reports first noticing a lump on the inner aspect of her right arm about a month ago.  She then had pain between her shoulder and elbow on the inner aspect of her arm.  An ultrasound was performed, which showed a tiny soft tissue nodule.  She reports the pain has resolved and now she only notices some stringy feeling tissue towards her axilla.  She has not noticed any inflamed or swollen lymph nodes in her axilla.  She denies any other changes.  Of note, she does have a sleeve of well-healed tattoos on her right arm.    Past Medical History:   Joint pain    Past Surgical History:   Left foot cyst excision  [place] tooth extraction    Family History:    Mother-hypertension, thyroid disease, depression and anxiety  Maternal grandmother-heart disease  Paternal grandfather-DVT  Denies family history of breast, ovarian, or colon cancer    Social History:    Single  Denies tobacco use  Denies alcohol use    Allergies:   No Known Allergies    Medications:     Current Outpatient Medications:     celecoxib (CeleBREX) 200 MG capsule, Take 1 capsule by mouth 2 (Two) Times a Day As Needed for Mild Pain., Disp: 30 capsule, Rfl: 0    Levonorgestrel (Malaika) 13.5 MG intrauterine device IUD, 1 each by Intrauterine route 1 (One) Time., Disp: , Rfl:     Radiology/Endoscopy:    Personally reviewed the images from the ultrasound of the right arm, which shows a very small superficial soft tissue nodule measuring 4 x 2 x 7 mm that is superficial to the muscle.    Labs:    Labs from 2/13/2024 reviewed.  Chemistry within normal limits.  CBC within normal limits.    Review of Systems:   Influenza-like illness: no fever, no  cough, no  sore throat, no  body aches, no loss of sense of  taste or smell, no known exposure to person with Covid-19.  Constitutional: Negative for fevers or chills  HENT: Negative for hearing loss or runny nose  Eyes: Negative for vision changes or scleral icterus  Respiratory: Negative for cough or shortness of breath  Cardiovascular: Negative for chest pain or heart palpitations  Gastrointestinal: Negative for abdominal pain, nausea, vomiting, constipation, melena, or hematochezia  Genitourinary: Negative for hematuria or dysuria  Musculoskeletal: Negative for joint swelling or gait instability  Neurologic: Negative for tremors or seizures  Psychiatric: Negative for suicidal ideations or depression  All other systems reviewed and negative    Physical Exam:   Body mass index is 24.87 kg/m².  Constitutional: Well-developed well-nourished, no acute distress  Eyes: Conjunctiva normal, sclera nonicteric  ENMT: Hearing grossly normal, oral mucosa moist  Neck: Supple, trachea midline  Respiratory: No increased work of breathing, normal inspiratory effort  Cardiovascular: no peripheral edema, no jugular venous distention  Lymphatics (palpable nodes):  axillary-negative  Skin:  Warm, dry, no rash on visualized skin surfaces.  On the right arm, there are no overlying skin changes to the area of interest which is just inferior to the medial bicipital groove close to the axilla. In the soft tissue I can few a few string-like abnormalities that are nontender. These feel like fibrotic lymph channels  Psychiatric: Alert and oriented ×3, normal affect     BMI is within normal parameters. No other follow-up for BMI required.    Assessment/Plan:  Right upper arm soft tissue mass  I suspect this is a result of some tattoo ink in the lymphovascular channels causing some scarring.  Her exam is otherwise benign with no worrisome findings.  I offered to order a needle biopsy, however since her symptoms have resolved I think it is safe to forego this for now.  I instructed her to follow-up if  she develops any increased swelling, pain, or obvious lymph nodes in the axilla.  She verbalized understanding.      ROS FOX M.D.  General, Robotic, and Endoscopic Surgery  [facility]    [address], [address]  [place], [place], [address]  P: [phone]  F: [phone]

## 2024-03-20 ENCOUNTER — HOSPITAL ENCOUNTER (OUTPATIENT)
Dept: MRI IMAGING | Facility: HOSPITAL | Age: 29
Discharge: HOME | End: 2024-03-20
Admitting: FAMILY MEDICINE
Payer: OTHER GOVERNMENT

## 2024-03-20 DIAGNOSIS — M22.2X1 PATELLOFEMORAL PAIN SYNDROME OF RIGHT KNEE: ICD-10-CM

## 2024-03-20 DIAGNOSIS — M25.561 ACUTE PAIN OF RIGHT KNEE: ICD-10-CM

## 2024-03-20 PROCEDURE — 73721 MRI JNT OF LWR EXTRE W/O DYE: CPT

## 2024-03-27 ENCOUNTER — OFFICE VISIT (OUTPATIENT)
Dept: SPORTS MEDICINE | Facility: CLINIC | Age: 29
End: 2024-03-27
Payer: OTHER GOVERNMENT

## 2024-03-27 VITALS
HEART RATE: 81 BPM | SYSTOLIC BLOOD PRESSURE: 120 MMHG | BODY MASS INDEX: 25.03 KG/M2 | HEIGHT: 62 IN | DIASTOLIC BLOOD PRESSURE: 70 MMHG | OXYGEN SATURATION: 99 % | WEIGHT: 136 LBS | RESPIRATION RATE: 16 BRPM

## 2024-03-27 DIAGNOSIS — M25.561 ACUTE PAIN OF RIGHT KNEE: ICD-10-CM

## 2024-03-27 DIAGNOSIS — M84.351A STRESS FRACTURE OF RIGHT FEMUR, INITIAL ENCOUNTER: Primary | ICD-10-CM

## 2024-03-27 PROCEDURE — 99213 OFFICE O/P EST LOW 20 MIN: CPT | Performed by: FAMILY MEDICINE

## 2024-03-27 NOTE — PROGRESS NOTES
Vernell is a 28 y.o. year old female presents to [facility]    Chief Complaint   Patient presents with    Right Knee - Follow-up, Pain     F/u eval for RT knee pain with patellofemoral pain syndrome - reports no improvement since last visit - here for further evaluation and treatment        History of Present Illness  History of Present Illness  The patient presents for evaluation of right knee pain.    Her knee is not doing great. When she read her MRI results, she basically made a lot of modifications to what she was doing because she has had stress fractures in her elbows and not in her lower extremities. She had stopped taking stairs whenever possible, minimized her walking, and started taking calcium and vitamin D supplements. She knows she is low in vitamin D because of blood work in 01/2024. Right now, she feels very rested, but it is definitely not better. Her symptoms started in 12/2023 and then she started running again because she was dealing with a hip injury in 02/2024. She does not limp on flat ground. Walking does not really bother her, but inclines and stairs bother her. Her PT test is scheduled for 04/24/2024. For the last 2 weeks, she has done no lower body exercises, especially since she got the MRI results. She would rather push through the PT test. She runs 300 meters and then 1.5 miles. She has been swimming because biking still irritates her knee a little bit. She was doing indoor cycling prior to knowing the stress fracture. She tried to avoid coming out of the saddle as much as possible. She has not done any elliptical. She wonders if there is a muscular imbalance that would put more pressure on that part of her knee. She enjoys running. She does not want to take too much vitamin D. She is taking calcium 600. She does not consume a lot of dairy. She did gymnastics growing up and that is when she had stress fractures.    She has had a stress fracture in both  "of her elbows. She had a stress reaction in her shin before.    I have reviewed the patient's medical, family, and social history in detail and updated the computerized patient record.    /70 (BP Location: Right arm, Patient Position: Sitting, Cuff Size: Adult)   Pulse 81   Resp 16   Ht 157.5 cm (62.01\")   Wt 61.7 kg (136 lb)   SpO2 99%   BMI 24.87 kg/m²      Physical Exam    Vital signs reviewed.   General: No acute distress.  Eyes: conjunctiva clear; pupils equally round and reactive  ENT: external ears atraumatic  CV: no peripheral edema  Resp: normal respiratory effort, no use of accessory muscles  Skin: no rashes or wounds; normal turgor  Psych: mood and affect appropriate; recent and remote memory intact  Neuro: sensation to light touch intact    MSK Exam  Physical Exam  Right knee demonstrates bony tenderness with deep flexion of the medial femoral condyle. No joint effusion.    MRI Knee Right Without Contrast (03/20/2024 08:29)       Results  Imaging  Right knee MRI was reviewed with the patient.         Diagnoses and all orders for this visit:    Stress fracture of right femur, initial encounter    Acute pain of right knee      Assessment & Plan  1. Right knee stress fracture.  She does have a stress fracture in the knee. It is on the weightbearing part of the knee. She does not have any chondral defect, loss of the joint space, or lining of the joint on her MRI. She can cross train until her PT test. She can swim. I will give her a handout on return to running from stress fractures. She can start weight training a little bit before running again. She can take calcium 1200 mg and vitamin D 2000 units. If she can start taking stairs pain free, then it is probably a safe time to start getting back into exercise.    Follow-up  The patient will follow up as needed.          Follow Up     Patient was given instructions and counseling regarding her condition or for health maintenance advice. Please " see specific information pulled into the AVS if appropriate.     Patient or patient representative verbalized consent for the use of Ambient Listening during the visit with  ZAIRA Jain Jr., DO for chart documentation. 3/27/2024  13:01 EDT

## 2024-06-03 ENCOUNTER — OFFICE VISIT (OUTPATIENT)
Dept: SPORTS MEDICINE | Facility: CLINIC | Age: 29
End: 2024-06-03

## 2024-06-03 VITALS
BODY MASS INDEX: 25.03 KG/M2 | DIASTOLIC BLOOD PRESSURE: 60 MMHG | HEART RATE: 69 BPM | SYSTOLIC BLOOD PRESSURE: 102 MMHG | WEIGHT: 136 LBS | OXYGEN SATURATION: 99 % | HEIGHT: 62 IN | RESPIRATION RATE: 16 BRPM

## 2024-06-03 DIAGNOSIS — M84.351G STRESS FRACTURE OF RIGHT FEMUR WITH DELAYED HEALING, SUBSEQUENT ENCOUNTER: Primary | ICD-10-CM

## 2024-06-03 DIAGNOSIS — G89.29 CHRONIC PAIN OF RIGHT KNEE: ICD-10-CM

## 2024-06-03 DIAGNOSIS — M25.561 CHRONIC PAIN OF RIGHT KNEE: ICD-10-CM

## 2024-06-03 PROCEDURE — 99213 OFFICE O/P EST LOW 20 MIN: CPT | Performed by: FAMILY MEDICINE

## 2024-06-03 NOTE — LETTER
Latasha 3, 2024     Patient: Vernell Ornelas   YOB: 1995   Date of Visit: 6/3/2024       To Whom It May Concern:    It is my medical opinion that Vernell Ornelas should be excused from her physical  responsibilities for the weekend of June 6-9, 2024.           Sincerely,        ZAIRA Jain Jr., DO    CC:   No Recipients

## 2024-06-03 NOTE — PROGRESS NOTES
Vernell is a 28 y.o. 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    The patient reports an escalation in her right knee pain, despite making significant lifestyle changes, including supplementation and significant reduction in activity levels. Despite a slight improvement, she continues to experience mild discomfort when ascending and descending stairs. Repetitive movements such as swimming, kicking, and kicking exacerbate her knee discomfort, necessitating a training process for a physical therapy test. She denies any noticeable knee swelling. Rest and lying down do not induce pain, however, extension of the knee induces pain, which she describes as a pressure-like sensation at a specific angle. She has not applied ice to the knee and has not taken any medication for the pain. Her physical activity is limited to walking, and she avoids stair use unless absolutely necessary. At work, she uses the elevator and has ceased swimming due to the pain. She does not engage in cycling. She attributes her knee pain to her  service this weekend, for which she has already communicated with her  unit, who advised that a doctor's note is not necessary. She was initially unaware of her knee issues until recently when she changed units. She has not undergone physical therapy for her knee, but has previously undergone physical therapy for her hip.    I have reviewed the patient's medical, family, and social history in  "detail and updated the computerized patient record.    /60 (BP Location: Right arm, Patient Position: Sitting, Cuff Size: Adult)   Pulse 69   Resp 16   Ht 157.5 cm (62.01\")   Wt 61.7 kg (136 lb)   SpO2 99%   BMI 24.87 kg/m²      Physical Exam    Vital signs reviewed.   General: No acute distress.  Eyes: conjunctiva clear; pupils equally round and reactive  ENT: external ears atraumatic  CV: no peripheral edema  Resp: normal respiratory effort, no use of accessory muscles  Skin: no rashes or wounds; normal turgor  Psych: mood and affect appropriate; recent and remote memory intact  Neuro: sensation to light touch intact    MSK Exam  Physical Exam  The right knee shows no effusion and shows full range of motion. There is positive retropatellar crepitus. Lachman's test is negative. Medial and lateral Zayda's tests are negative. There is minimal tenderness along the medial femoral condyle in the weightbearing portion when the knee is placed in deep flexion, which is much improved from prior check.    MRI Knee Right Without Contrast (03/20/2024 08:29)       Results  Imaging  MRI of the knee showed a healing stress fracture on the weightbearing part of the knee.         Diagnoses and all orders for this visit:    Stress fracture of right femur with delayed healing, subsequent encounter  -     MRI Knee Right Without Contrast; Future    Chronic pain of right knee  -     MRI Knee Right Without Contrast; Future      Assessment & Plan  1. Pain in the right knee.  An MRI of the right knee will be ordered to further investigate the cause of the pain. The patient will be notified of the results once they are available. A note for her  service will be provided. In the event that the MRI does not reveal any structural abnormalities, the initiation of physical therapy will be beneficial.          Follow Up     Patient was given instructions and counseling regarding her condition or for health maintenance " advice. Please see specific information pulled into the AVS if appropriate.     Patient or patient representative verbalized consent for the use of Ambient Listening during the visit with  ZAIRA Jain Jr., DO for chart documentation. 6/3/2024  09:13 EDT

## 2024-06-19 ENCOUNTER — HOSPITAL ENCOUNTER (OUTPATIENT)
Dept: MRI IMAGING | Facility: HOSPITAL | Age: 29
Discharge: HOME | End: 2024-06-19
Admitting: FAMILY MEDICINE
Payer: COMMERCIAL

## 2024-06-19 DIAGNOSIS — M25.561 CHRONIC PAIN OF RIGHT KNEE: ICD-10-CM

## 2024-06-19 DIAGNOSIS — G89.29 CHRONIC PAIN OF RIGHT KNEE: ICD-10-CM

## 2024-06-19 DIAGNOSIS — M84.351G STRESS FRACTURE OF RIGHT FEMUR WITH DELAYED HEALING, SUBSEQUENT ENCOUNTER: ICD-10-CM

## 2024-06-19 PROCEDURE — 73721 MRI JNT OF LWR EXTRE W/O DYE: CPT

## 2024-06-26 ENCOUNTER — OFFICE VISIT (OUTPATIENT)
Dept: SPORTS MEDICINE | Facility: CLINIC | Age: 29
End: 2024-06-26
Payer: COMMERCIAL

## 2024-06-26 VITALS
HEART RATE: 88 BPM | HEIGHT: 62 IN | SYSTOLIC BLOOD PRESSURE: 128 MMHG | DIASTOLIC BLOOD PRESSURE: 70 MMHG | BODY MASS INDEX: 25.03 KG/M2 | WEIGHT: 136 LBS | RESPIRATION RATE: 16 BRPM | OXYGEN SATURATION: 98 %

## 2024-06-26 DIAGNOSIS — M25.561 CHRONIC PAIN OF RIGHT KNEE: Primary | ICD-10-CM

## 2024-06-26 DIAGNOSIS — M94.261 CHONDROMALACIA OF MEDIAL CONDYLE OF RIGHT FEMUR: ICD-10-CM

## 2024-06-26 DIAGNOSIS — G89.29 CHRONIC PAIN OF RIGHT KNEE: Primary | ICD-10-CM

## 2024-06-26 PROCEDURE — 99213 OFFICE O/P EST LOW 20 MIN: CPT | Performed by: FAMILY MEDICINE

## 2024-06-26 NOTE — PROGRESS NOTES
"Vernell is a 28 y.o. year old female presents to [facility]    Chief Complaint   Patient presents with    Right Knee - Follow-up     F/u eval for RT knee pain and femur stress fracture - here for MRI review and possible ACP PRP        History of Present Illness  History of Present Illness  The patient is a 28-year-old female who presents for evaluation of right knee pain.    The patient reports persistent discomfort in her right knee, which she describes as not fully healed. She expresses a desire to engage in physical activities, specifically swimming, to facilitate knee functionality. She has yet to commence physical therapy due to insurance constraints.    I have reviewed the patient's medical, family, and social history in detail and updated the computerized patient record.    /70 (BP Location: Right arm, Patient Position: Sitting, Cuff Size: Adult)   Pulse 88   Resp 16   Ht 157.5 cm (62.01\")   Wt 61.7 kg (136 lb)   SpO2 98%   BMI 24.87 kg/m²      Physical Exam    Vital signs reviewed.   General: No acute distress.  Eyes: conjunctiva clear; pupils equally round and reactive  ENT: external ears atraumatic  CV: no peripheral edema  Resp: normal respiratory effort, no use of accessory muscles  Skin: no rashes or wounds; normal turgor  Psych: mood and affect appropriate; recent and remote memory intact  Neuro: sensation to light touch intact    Physical Exam      MRI Knee Right Without Contrast (06/19/2024 08:35)       Results  Imaging  MRI of the knee shows a stress fracture that is continuing to heal, but chondromalacia is developing.         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 Right knee pain 2/2 chondromalacia, resolving stress fx medial femoral chondyle.  The patient's stress fracture appears to be healing, however, the development of chondromalacia is " evident. ACP PRP injection of the right knee has been scheduled for the patient.          Follow Up     Patient was given instructions and counseling regarding her condition or for health maintenance advice. Please see specific information pulled into the AVS if appropriate.     Patient or patient representative verbalized consent for the use of Ambient Listening during the visit with  ZAIRA Jain Jr., DO for chart documentation. 6/26/2024  14:39 EDT

## 2024-07-01 ENCOUNTER — PROCEDURE VISIT (OUTPATIENT)
Dept: SPORTS MEDICINE | Facility: CLINIC | Age: 29
End: 2024-07-01

## 2024-07-01 VITALS
HEART RATE: 92 BPM | HEIGHT: 62 IN | RESPIRATION RATE: 16 BRPM | BODY MASS INDEX: 25.03 KG/M2 | SYSTOLIC BLOOD PRESSURE: 120 MMHG | OXYGEN SATURATION: 99 % | WEIGHT: 136 LBS | DIASTOLIC BLOOD PRESSURE: 70 MMHG

## 2024-07-01 DIAGNOSIS — M94.261 CHONDROMALACIA OF MEDIAL CONDYLE OF RIGHT FEMUR: Primary | ICD-10-CM

## 2024-07-01 DIAGNOSIS — M25.561 CHRONIC PAIN OF RIGHT KNEE: ICD-10-CM

## 2024-07-01 DIAGNOSIS — G89.29 CHRONIC PAIN OF RIGHT KNEE: ICD-10-CM

## 2024-07-01 PROCEDURE — PRPACP: Performed by: FAMILY MEDICINE

## 2024-07-01 NOTE — PROGRESS NOTES
"Ultrasound Guided Knee Injection Procedure Note    Right knee injection was discussed with the patient in detail, including indication, risks, benefits, and alternatives. Verbal consent was given for the procedure. Injection was performed by physician. Ultrasound guidance was used for injection accuracy.  Injection site was identified by physical examination and cleaned with Betadine and alcohol swabs. Prior to needle insertion, ethyl chloride spray was used for surface anesthesia. Sterile technique was used.  A 22-gauge, 1.5\" needle was directed to the joint from a superolateral approach. Injectate was passed into the joint space without difficulty. The needle was removed and a simple bandage was applied. The procedure was tolerated well without difficulty.    Injection mixture:  ACP PRP 4 mL  "

## 2024-07-01 NOTE — PATIENT INSTRUCTIONS
Avoid NSAIDs for 1 weeks.  Okay to utilize ice as needed for discomfort.  If brace is recommended, please wear as instructed.  Okay to remove during sleep.  Initiate physical therapy in 1 weeks.  Follow-up as recommended

## 2024-07-17 ENCOUNTER — OFFICE VISIT (OUTPATIENT)
Dept: OBSTETRICS AND GYNECOLOGY | Facility: CLINIC | Age: 29
End: 2024-07-17
Payer: COMMERCIAL

## 2024-07-17 VITALS
SYSTOLIC BLOOD PRESSURE: 108 MMHG | BODY MASS INDEX: 24.14 KG/M2 | WEIGHT: 131.2 LBS | HEIGHT: 62 IN | DIASTOLIC BLOOD PRESSURE: 68 MMHG

## 2024-07-17 DIAGNOSIS — Z30.432 ENCOUNTER FOR IUD REMOVAL: ICD-10-CM

## 2024-07-17 DIAGNOSIS — Z01.419 ROUTINE GYNECOLOGICAL EXAMINATION: ICD-10-CM

## 2024-07-17 DIAGNOSIS — Z01.419 CERVICAL SMEAR, AS PART OF ROUTINE GYNECOLOGICAL EXAMINATION: Primary | ICD-10-CM

## 2024-07-17 LAB
B-HCG UR QL: NEGATIVE
BILIRUB BLD-MCNC: NEGATIVE MG/DL
CLARITY, POC: CLEAR
COLOR UR: YELLOW
EXPIRATION DATE: NORMAL
GLUCOSE UR STRIP-MCNC: NEGATIVE MG/DL
INTERNAL NEGATIVE CONTROL: NORMAL
INTERNAL POSITIVE CONTROL: NORMAL
KETONES UR QL: NEGATIVE
LEUKOCYTE EST, POC: NEGATIVE
Lab: NORMAL
NITRITE UR-MCNC: NEGATIVE MG/ML
PH UR: 5 [PH] (ref 5–8)
PROT UR STRIP-MCNC: NEGATIVE MG/DL
RBC # UR STRIP: ABNORMAL /UL
SP GR UR: 1 (ref 1–1.03)
UROBILINOGEN UR QL: NORMAL

## 2024-07-17 NOTE — PROGRESS NOTES
GYN Annual Exam     CC- Here for annual exam.     Vernell Ornelas is a 28 y.o. female established patient who presents for annual well woman exam.  She had a Malaika IUD placed in 10/2020 and wants it removed. She is engaged and getting  in October. Her fiance has a vas. She is on her cycle today.    OB History          0    Para   0    Term   0       0    AB   0    Living   0         SAB   0    IAB   0    Ectopic   0    Molar        Multiple   0    Live Births              Obstetric Comments   No plans               Menarche: 17  Current contraception: Malaika placed 10/2020 & vas  History of abnormal Pap smear: no  History of abnormal mammogram: no  Family history of uterine, colon or ovarian cancer: no  Family history of breast cancer: no  H/o STDs: none  Last pap: 3/2022- nl pap  Gardasil:completed  ELIAZAR: PGF- DVT    Health Maintenance   Topic Date Due    HEPATITIS C SCREENING  Never done    Annual Gynecologic Pelvic and Breast Exam  03/10/2023    COVID-19 Vaccine (2023- season) 2023    INFLUENZA VACCINE  2024    ANNUAL PHYSICAL  2025    PAP SMEAR  2027    TDAP/TD VACCINES (3 - Td or Tdap) 10/23/2029    HPV VACCINES  Completed    Pneumococcal Vaccine 0-64  Aged Out    CHLAMYDIA SCREENING  Discontinued       Past Medical History:   Diagnosis Date    Joint pain     or swelling       Past Surgical History:   Procedure Laterality Date    CYSTECTOMY  2015    cyst removal on left foot     WISDOM TOOTH EXTRACTION         No current outpatient medications on file.    No Known Allergies    Social History     Tobacco Use    Smoking status: Never    Smokeless tobacco: Never   Vaping Use    Vaping status: Never Used   Substance Use Topics    Alcohol use: No    Drug use: No       Family History   Problem Relation Age of Onset    Hypertension Other     Heart disease Other     Hypertension Mother     Thyroid disease Mother     Depression Mother     Anxiety disorder Mother   "   Heart disease Maternal Grandmother     Anxiety disorder Maternal Grandfather     Deep vein thrombosis Paternal Grandfather     Breast cancer Neg Hx     Ovarian cancer Neg Hx     Colon cancer Neg Hx     Pulmonary embolism Neg Hx     Birth defects Neg Hx     Mental retardation Neg Hx        Review of Systems   Constitutional:  Positive for activity change. Negative for appetite change, fatigue, fever and unexpected weight change.   Eyes:  Negative for photophobia and visual disturbance.   Respiratory:  Negative for cough and shortness of breath.    Cardiovascular:  Negative for chest pain and palpitations.   Gastrointestinal:  Negative for abdominal distention, abdominal pain, constipation, diarrhea and nausea.   Endocrine: Negative for cold intolerance and heat intolerance.   Genitourinary:  Positive for vaginal bleeding. Negative for decreased urine volume, dyspareunia, dysuria, menstrual problem, pelvic pain and vaginal discharge.   Musculoskeletal:  Negative for back pain.   Skin:  Negative for color change and rash.   Neurological:  Negative for headaches.   Hematological:  Negative for adenopathy. Does not bruise/bleed easily.   Psychiatric/Behavioral:  Negative for dysphoric mood. The patient is not nervous/anxious.        /68   Ht 157.5 cm (62\")   Wt 59.5 kg (131 lb 3.2 oz)   LMP 07/16/2024 (Exact Date)   BMI 24.00 kg/m²     Physical Exam   Constitutional: She is oriented to person, place, and time. She appears well-developed.   HENT:   Head: Normocephalic and atraumatic.   Eyes: Conjunctivae are normal. No scleral icterus.   Neck: No thyromegaly present.   Cardiovascular: Normal rate and regular rhythm.   Pulmonary/Chest: Effort normal and breath sounds normal. Right breast exhibits no inverted nipple, no mass, no nipple discharge, no skin change and no tenderness. Left breast exhibits no inverted nipple, no mass, no nipple discharge, no skin change and no tenderness.   Abdominal: Soft. Normal " appearance and bowel sounds are normal. She exhibits no distension and no mass. There is no abdominal tenderness. There is no rebound and no guarding. No hernia.   Genitourinary:    Pelvic exam was performed with patient supine.   There is no rash, tenderness, lesion or injury on the right labia. There is no rash, tenderness, lesion or injury on the left labia. Uterus is not deviated, not enlarged, not fixed and not tender. Cervix exhibits no motion tenderness, no discharge and no friability. Right adnexum displays no mass, no tenderness and no fullness. Left adnexum displays no mass, no tenderness and no fullness.    Vaginal bleeding present.      No vaginal discharge, erythema or tenderness.   There is bleeding in the vagina. No erythema or tenderness in the vagina.    No foreign body in the vagina.      No signs of injury in the vagina.      Genitourinary Comments: IUD string seen       Neurological: She is alert and oriented to person, place, and time.   Skin: Skin is warm and dry.   Psychiatric: Her behavior is normal. Mood, judgment and thought content normal.   Nursing note and vitals reviewed.          IUD Removal Procedure Note    Type of IUD:  Malaika  Date of insertion:  known  Reason for removal:  Device expiration  Other relevant history/information:  none  UPT: negative    Procedure Time Out Documentation      Procedure Details  IUD strings visible:  yes  Local anesthesia:  None  Tenaculum used:  None  Removal:  IUD strings grasped and IUD removed intact with gentle traction.  The patient tolerated the procedure well.    All appropriate instructions regarding removal were reviewed.    Tolerated well  No apparent complications  Post procedure diagnosis : IUD removal     Plans for contraception:  vasectomy    Other follow-up needed:  none    The patient was advised to call for any fever or for prolonged or severe pain or bleeding. She was advised to use OTC analgesics as needed for mild to moderate pain.                 Assessment/Plan    1) GYN HM: pap/G/C/T  SBE demonstrated and encouraged.  2) STD screening: declines Condoms encouraged.  3) Contraception: Malaika removed. Partner has vas.   4) Family Planning: family planning: no plans at present, encourage folic acid daily  5) Diet and Exercise discussed  6) Smoking Status: No  7) Social: engaged.   8) MMG- plan age 42  9)Parts of this document have been copied or forwarded from her previous visits and have been reviewed, updated and edited as indicated.   10) Follow up prn or 1 year annual        Diagnoses and all orders for this visit:    1. Cervical smear, as part of routine gynecological examination (Primary)  -     IGP,CtNgTv,rfx Aptima HPV ASCU    2. Routine gynecological examination  -     POC Urinalysis Dipstick, Multipro  -     IGP,CtNgTv,rfx Aptima HPV ASCU    3. Encounter for IUD removal  -     POC Pregnancy, Urine    Other orders  -     HPV DNA Probe, Direct - ,          Ashlie Moses MD  7/17/2024    20:28 EDT

## 2024-07-24 LAB
C TRACH RRNA CVX QL NAA+PROBE: NEGATIVE
CONV .: ABNORMAL
CYTOLOGIST CVX/VAG CYTO: ABNORMAL
CYTOLOGY CVX/VAG DOC CYTO: ABNORMAL
CYTOLOGY CVX/VAG DOC THIN PREP: ABNORMAL
DX ICD CODE: ABNORMAL
DX ICD CODE: ABNORMAL
HPV I/H RISK 4 DNA CVX QL PROBE+SIG AMP: POSITIVE
Lab: ABNORMAL
N GONORRHOEA RRNA CVX QL NAA+PROBE: NEGATIVE
OTHER STN SPEC: ABNORMAL
PATHOLOGIST CVX/VAG CYTO: ABNORMAL
STAT OF ADQ CVX/VAG CYTO-IMP: ABNORMAL
T VAGINALIS RRNA SPEC QL NAA+PROBE: NEGATIVE

## 2024-07-31 ENCOUNTER — OFFICE VISIT (OUTPATIENT)
Dept: OBSTETRICS AND GYNECOLOGY | Facility: CLINIC | Age: 29
End: 2024-07-31
Payer: COMMERCIAL

## 2024-07-31 VITALS
DIASTOLIC BLOOD PRESSURE: 84 MMHG | SYSTOLIC BLOOD PRESSURE: 124 MMHG | BODY MASS INDEX: 23.92 KG/M2 | WEIGHT: 130 LBS | HEIGHT: 62 IN

## 2024-07-31 DIAGNOSIS — R87.810 ASCUS WITH POSITIVE HIGH RISK HPV CERVICAL: Primary | ICD-10-CM

## 2024-07-31 DIAGNOSIS — Z13.9 SCREENING FOR CONDITION: ICD-10-CM

## 2024-07-31 DIAGNOSIS — R87.610 ASCUS WITH POSITIVE HIGH RISK HPV CERVICAL: Primary | ICD-10-CM

## 2024-07-31 LAB
B-HCG UR QL: NEGATIVE
BILIRUB BLD-MCNC: NEGATIVE MG/DL
CLARITY, POC: CLEAR
COLOR UR: YELLOW
EXPIRATION DATE: NORMAL
GLUCOSE UR STRIP-MCNC: NEGATIVE MG/DL
INTERNAL NEGATIVE CONTROL: NORMAL
INTERNAL POSITIVE CONTROL: NORMAL
KETONES UR QL: NEGATIVE
LEUKOCYTE EST, POC: NEGATIVE
Lab: NORMAL
NITRITE UR-MCNC: NEGATIVE MG/ML
PH UR: 5 [PH] (ref 5–8)
PROT UR STRIP-MCNC: NEGATIVE MG/DL
RBC # UR STRIP: NEGATIVE /UL
SP GR UR: 1 (ref 1–1.03)
UROBILINOGEN UR QL: NORMAL

## 2024-08-06 LAB
DX ICD CODE: NORMAL
PATH REPORT.FINAL DX SPEC: NORMAL
PATH REPORT.GROSS SPEC: NORMAL
PATH REPORT.SITE OF ORIGIN SPEC: NORMAL
PATHOLOGIST NAME: NORMAL
PAYMENT PROCEDURE: NORMAL

## 2024-08-15 DIAGNOSIS — Z00.00 HEALTHCARE MAINTENANCE: Primary | ICD-10-CM

## 2024-08-21 ENCOUNTER — OFFICE VISIT (OUTPATIENT)
Dept: SPORTS MEDICINE | Facility: CLINIC | Age: 29
End: 2024-08-21
Payer: COMMERCIAL

## 2024-08-21 VITALS
BODY MASS INDEX: 23.92 KG/M2 | DIASTOLIC BLOOD PRESSURE: 80 MMHG | HEART RATE: 87 BPM | HEIGHT: 62 IN | SYSTOLIC BLOOD PRESSURE: 118 MMHG | OXYGEN SATURATION: 98 % | RESPIRATION RATE: 16 BRPM | WEIGHT: 130 LBS

## 2024-08-21 DIAGNOSIS — G89.29 CHRONIC PAIN OF RIGHT KNEE: ICD-10-CM

## 2024-08-21 DIAGNOSIS — M94.261 CHONDROMALACIA OF MEDIAL CONDYLE OF RIGHT FEMUR: Primary | ICD-10-CM

## 2024-08-21 DIAGNOSIS — M17.11 PRIMARY OSTEOARTHRITIS OF RIGHT KNEE: ICD-10-CM

## 2024-08-21 DIAGNOSIS — M84.351G STRESS FRACTURE OF RIGHT FEMUR WITH DELAYED HEALING, SUBSEQUENT ENCOUNTER: ICD-10-CM

## 2024-08-21 DIAGNOSIS — M25.561 CHRONIC PAIN OF RIGHT KNEE: ICD-10-CM

## 2024-08-21 PROCEDURE — 99214 OFFICE O/P EST MOD 30 MIN: CPT | Performed by: FAMILY MEDICINE

## 2024-08-21 NOTE — PROGRESS NOTES
Vernell is a 28 y.o. year old female presents to [facility]    Chief Complaint   Patient presents with    Follow-up     F/u eval for RT knee s/p ACP PRP on 07/01/2024 - reports she is not doing any better since last visit - here for further evaluation and treatment        History of Present Illness  History of Present Illness  The patient presents for evaluation of right knee pain.    She experiences severe pain in her right knee, which is so intense that it prevents her from running. Walking uphill or downhill, particularly downhill, also causes pain. She has been attempting to use stairs; ascending is manageable, but descending is painful and results in a limp. Squatting and lunging do not cause discomfort, but deadlifting, even without weight, triggers pain in her knee. She has been engaging in light weight training, focusing on unilateral exercises to maintain balance.    A new symptom has emerged since her last visit: a sensation of needing to crack her knee, particularly in the mornings. This sensation persists throughout the day but is most pronounced in the morning. The pain subsides once she manages to crack her knee. Occasionally, after sitting for a while or upon waking up, her knee feels immobile until it cracks. She is unsure how to induce this cracking. She also experiences pain during deadlifts, sometimes on the side of her knee and at other times behind her kneecap.    She has been informed that she is in the early stages of arthritis. She is seeking a temporary profile, not a permanent one. She has requested a short-term letter from the doctor for the , stating that paperwork has been initiated and outlining what activities she should avoid in the short term. She has also requested a note specifying the events she cannot participate in for the PT test, such as running and deadlifting.    She reports no history of ADHD, concussion, fainting, insomnia, anxiety,  "depression, headaches, PTSD, diabetes, high blood pressure, seizures, asthma, dizziness, high cholesterol, or sleep apnea. She does have arthritis in her shoulders and joint pain.    ALLERGIES  The patient has no known allergies to medication, food, insects, grass, plants or other.    I have reviewed the patient's medical, family, and social history in detail and updated the computerized patient record.    /80 (BP Location: Left arm, Patient Position: Sitting, Cuff Size: Adult)   Pulse 87   Resp 16   Ht 157.5 cm (62.01\")   Wt 59 kg (130 lb)   SpO2 98%   BMI 23.77 kg/m²      Physical Exam    Vital signs reviewed.   General: No acute distress.  Eyes: conjunctiva clear; pupils equally round and reactive  ENT: external ears atraumatic  CV: no peripheral edema  Resp: normal respiratory effort, no use of accessory muscles  Skin: no rashes or wounds; normal turgor  Psych: mood and affect appropriate; recent and remote memory intact  Neuro: sensation to light touch intact    MSK Exam  Physical Exam  Right knee exhibits positive retropatellar crepitus, negative patellar apprehension test, full range of motion. No bony tenderness along the medial femoral condyle, negative medial, negative lateral Zayda.    MRI Knee Right Without Contrast (06/19/2024 08:35)       Results           Diagnoses and all orders for this visit:    Chondromalacia of medial condyle of right femur    Chronic pain of right knee    Stress fracture of right femur with delayed healing, subsequent encounter      Assessment & Plan  1. Stress fracture right femur.  The stress fracture in the right femur is being managed with PRP treatment to aid in healing. While PRP can sometimes cause inflammation, this is not the primary issue here. The main concern is the underperformance of the quadriceps muscle, which is weaker than expected, leading to morning pain and stiffness. Physical therapy was suggested to help strengthen the quadriceps muscle. A " note will be provided for her  service, stating that she should refrain from running and deadlifting activities for the short term, with an anticipated duration of 90 days.    2. Chronic right knee pain.  The chronic right knee pain is exacerbated by activities such as deadlifting and walking downhill. The pain is particularly severe when descending stairs, causing her to limp. A gel shot was recommended as the next best step to provide cushioning to the joint. This will be discussed with her insurance provider for approval. The gel shot, derived from bacteria, mimics healthy joint fluid and provides cushioning to the joint. It is composed of hyaluronic acid, a natural component of our joints, and does not degrade the joint. Physical therapy was also suggested as a beneficial treatment option.    3. Chondromalacia.  The daily clicking and popping sounds are indicative of chondromalacia, which is causing pain due to relative weakness from reduced activity levels. The gel shot is expected to help with the gliding of the kneecap and improve functional capacity. Physical therapy was recommended to help strengthen the quadriceps muscle and improve overall knee function.    Follow-up  A follow-up appointment is scheduled for 6 weeks from now.      I spent 30 minutes caring for Vernell on this date of service. This time includes time spent by me in the following activities:preparing for the visit, reviewing tests, obtaining and/or reviewing a separately obtained history, performing a medically appropriate examination and/or evaluation , counseling and educating the patient/family/caregiver, ordering medications, tests, or procedures, referring and communicating with other health care professionals , documenting information in the medical record, independently interpreting results and communicating that information with the patient/family/caregiver, and care coordination    Follow Up     Patient was given  instructions and counseling regarding her condition or for health maintenance advice. Please see specific information pulled into the AVS if appropriate.     Patient or patient representative verbalized consent for the use of Ambient Listening during the visit with  ZAIRA Jain Jr., DO for chart documentation. 8/21/2024  10:12 EDT

## 2024-08-21 NOTE — LETTER
August 21, 2024     Patient: Vernell Ornelas   YOB: 1995   Date of Visit: 8/21/2024       To Whom It May Concern:    It is my medical opinion that Vernell Ornelas presented to the office today for follow up. I have completed required paperwork for functional activity assessment today and faxed. She cannot complete the [employer] Test at this time. I anticipate she will be fit to complete within 90 days though this is subject to her progress.           Sincerely,          ZAIRA Jain Jr., DO    CC:   No Recipients

## 2024-08-22 ENCOUNTER — PATIENT MESSAGE (OUTPATIENT)
Dept: SPORTS MEDICINE | Facility: CLINIC | Age: 29
End: 2024-08-22
Payer: COMMERCIAL

## 2024-08-28 ENCOUNTER — TREATMENT (OUTPATIENT)
Dept: PHYSICAL THERAPY | Facility: CLINIC | Age: 29
End: 2024-08-28
Payer: COMMERCIAL

## 2024-08-28 DIAGNOSIS — G89.29 CHRONIC PAIN OF RIGHT KNEE: Primary | ICD-10-CM

## 2024-08-28 DIAGNOSIS — M25.561 CHRONIC PAIN OF RIGHT KNEE: Primary | ICD-10-CM

## 2024-08-28 NOTE — PROGRESS NOTES
Physical Therapy Initial Evaluation and Plan of Care  [address], [address]  [place] [address]    Patient: Vernell Ornelas   : 1995  Diagnosis/ICD-10 Code:  Chronic pain of right knee [M25.561, G89.29]  Referring practitioner: Matthew Jain *  Date of Initial Visit: 2024  Today's Date: 2024  Patient seen for 1 session         Visit Diagnoses:    ICD-10-CM ICD-9-CM   1. Chronic pain of right knee  M25.561 719.46    G89.29 338.29         Subjective Questionnaire: LEFS: 50      Subjective Evaluation    History of Present Illness  Mechanism of injury: Patient is a 28 year old female who presents with right knee pain that has been bothering her since December.  Patient injured her left hip and was over compensating with her right side.  Reports having a stress fracture in the femur in March.  Has had 2 MRI's (March and May).    Reports that she decreased walking and cut off weight training, but reports that she has recently returned to some activities.    Reports an inability to run, jump and deadlift.  Reports difficulty with walking on uneven surfaces and stairs (worse going down).    Denies any knee surgeries.      Patient Occupation: [employer]  Current pain ratin  At worst pain ratin  Location: right medial joint line and behind patella  Quality: dull ache  Relieving factors: rest  Aggravating factors: ambulation, lifting and stairs (running, jumping)  Progression: improved             Objective          Observations     Additional Knee Observation Details  Patient ambulates with a guarded gait pattern.    Active Range of Motion     Right Knee   Flexion: Right knee active flexion: bilaterally equal.   Extension: Right knee active extension: WNL.     Strength/Myotome Testing     Right Hip   Planes of Motion   Flexion: 4  Abduction: 4+  Adduction: 4+    Right Knee   Flexion: 4+  Extension: 4+    Tests     Right Knee   Negative  valgus stress test at 0 degrees and valgus stress test at 30 degrees.     Additional Tests Details  - Pain with squatting  + Pain with lateral step downs          Assessment & Plan       Assessment  Impairments: abnormal gait, activity intolerance, impaired physical strength, lacks appropriate home exercise program and pain with function   Assessment details: Patient is a 28 year old female who presents with c/o pain, TTP, decreased strength, positive special testing and a guarded gait pattern which is limiting her ability to perform activities.  Barriers to therapy: none  Prognosis: good  Prognosis details: STG's to be met by 2 weeks  1)  Independent with HEP to show compliance  2)  Decrease pain by 50% or more to allow patient to perform self care and activities more comfortably  3)  Min to no TTP present to indicate improved healing response  4)  Patient to perform lateral step down without pain    LTG's to be met by 4 weeks  1)  Independent with HEP progression to show continued compliance  2)  Decrease pain by 75% or more to allow patient to return to activities and hobbies with minimal limitations   3)  Increase strength for the right hip and knee to 5/5 for improved stability with gait and stairs  4)  Negative special testing  5)  No TTP present to indicate improved healing response  6)  Patient to return to deadlifting and light jogging without pain in the knee        Plan  Therapy options: will be seen for skilled therapy services  Planned therapy interventions: strengthening, stretching, therapeutic activities, home exercise program, gait training and neuromuscular re-education  Frequency: 2x week  Duration in weeks: 4  Treatment plan discussed with: patient            Timed:         Manual Therapy:    0     mins  04147;     Therapeutic Exercise:    9     mins  07148;     Neuromuscular Bettye:    8    mins  55903;    Therapeutic Activity:     8     mins  32205;     Gait Trainin     mins  00454;      Ultrasound:     0     mins  65873;          Un-Timed:  Electrical Stimulation:    0     mins  34965 ( );    Low Eval     15     Mins  12592  Mod Eval     0     Mins  28055  High Eval                       0     Mins  47150        Timed Treatment:   25   mins   Total Treatment:     40   mins          PT: Keith Villalobos PT     [place] License [id number]  Electronically signed by Keith Villalobos PT, 08/28/24, 8:03 AM EDT    Certification Period: 8/28/2024 thru 11/25/2024  I certify that the therapy services are furnished while this patient is under my care.  The services outlined above are required by this patient, and will be reviewed every 90 days.    Matthew Jain Jr., Do  [address] [place] [address]   NPI: [id number]      Keith Villalobos PT   License number: [id number]        Physician Signature:__________________________________________________    PHYSICIAN: Matthew Jain Jr., DO      DATE:     Please sign and return via fax to [email or web address] . Thank you, [facility].

## 2024-08-28 NOTE — PATIENT INSTRUCTIONS
Handout given for HEP.  Discussed eval, POC, KT and HEP with the patient.   Spoke with pt regarding recommendations. Pt in agreement, new script submitted. transferred to scheduling.-[facility] for Pt regarding recommendation, also sent [facility] message.    From: Ghazal Chilel PA-C  Sent: 6/21/2024  11:22 AM CDT  To: Pam Madison  Subject: FW: new blood pressure medicine                  Would first try increasing to carvedilol 12.5 mg twice daily for this patient after transition from metoprolol at her last visit.  Higher readings at this time may be due to starting to low potency of carvedilol dose when compared to previous metoprolol 50 mg daily dose.  If blood pressure continues to rise with increased dose it may be an unexpected effect of carvedilol.  But with improvement could continue to increase carvedilol.  Continue to monitor blood pressure.  We should offer follow-up visit in 2-3 months to check on blood pressure in person.

## 2024-08-30 LAB
GAMMA INTERFERON BACKGROUND BLD IA-ACNC: 0 IU/ML
M TB IFN-G BLD-IMP: NEGATIVE
M TB IFN-G CD4+ BCKGRND COR BLD-ACNC: 0.1 IU/ML
M TB IFN-G CD4+CD8+ BCKGRND COR BLD-ACNC: 0.05 IU/ML
MITOGEN IGNF BCKGRD COR BLD-ACNC: >10 IU/ML
QUANTIFERON INCUBATION: NORMAL
SERVICE CMNT-IMP: NORMAL

## 2024-09-13 ENCOUNTER — TREATMENT (OUTPATIENT)
Dept: PHYSICAL THERAPY | Facility: CLINIC | Age: 29
End: 2024-09-13
Payer: COMMERCIAL

## 2024-09-13 DIAGNOSIS — G89.29 CHRONIC PAIN OF RIGHT KNEE: Primary | ICD-10-CM

## 2024-09-13 DIAGNOSIS — M25.561 CHRONIC PAIN OF RIGHT KNEE: Primary | ICD-10-CM

## 2024-09-13 NOTE — PROGRESS NOTES
Physical Therapy Daily Treatment Note  [facility]   [address], [address]  [place] [address]  P: [phone]       F: [phone]    Patient: Vernell Ornelas   : 1995  Diagnosis/ICD-10 Code:  Chronic pain of right knee [M25.561, G89.29]  Referring practitioner: Matthew Jain *  Date of Initial Visit: Type: THERAPY  Noted: 2024  Today's Date: 2024  Patient seen for 2 sessions       Vernell Ornelas reports: A little sore from exercises but R knee has been feeling better. I tried to do a regular deadlift last night with no weight and still felt pain.     Subjective     Objective   See Exercise, Manual, and Modality Logs for complete treatment.       Assessment/Plan  Subjectively, pt reports no increase of pain or discomfort with interventions performed today. Performed well with review of HEP with good recall of proper exercise form. Added LAQ and single leg RDL with no adverse reactions. Continues to benefit from verbal/tactile cues to ensure proper form and technique for exercise performance.     Progress per Plan of Care           Manual Therapy:         mins  34149;  Therapeutic Exercise:    13     mins  31626;     Neuromuscular Bettye:        mins  06030;    Therapeutic Activity:     10     mins  43252;     Gait Training:           mins  46592;     Ultrasound:          mins  67486;    Electrical Stimulation:         mins  51772;  Traction          mins 87159    Timed Treatment:   23   mins   Total Treatment:     23   mins    Andreea Jenkins PTA  Physical Therapist Assistant [id number]

## 2024-09-16 ENCOUNTER — PROCEDURE VISIT (OUTPATIENT)
Dept: SPORTS MEDICINE | Facility: CLINIC | Age: 29
End: 2024-09-16
Payer: COMMERCIAL

## 2024-09-16 VITALS
OXYGEN SATURATION: 99 % | BODY MASS INDEX: 23.92 KG/M2 | HEIGHT: 62 IN | DIASTOLIC BLOOD PRESSURE: 60 MMHG | SYSTOLIC BLOOD PRESSURE: 110 MMHG | WEIGHT: 130 LBS | HEART RATE: 74 BPM | RESPIRATION RATE: 16 BRPM

## 2024-09-16 DIAGNOSIS — M94.261 CHONDROMALACIA OF MEDIAL CONDYLE OF RIGHT FEMUR: ICD-10-CM

## 2024-09-16 DIAGNOSIS — M17.11 PRIMARY OSTEOARTHRITIS OF RIGHT KNEE: ICD-10-CM

## 2024-09-16 PROCEDURE — 20611 DRAIN/INJ JOINT/BURSA W/US: CPT | Performed by: FAMILY MEDICINE

## 2024-09-16 RX ORDER — HYALURONATE SOD, CROSS-LINKED 30 MG/3 ML
30 SYRINGE (ML) INTRAARTICULAR ONCE
COMMUNITY
Start: 2024-08-22

## 2024-09-18 ENCOUNTER — TREATMENT (OUTPATIENT)
Dept: PHYSICAL THERAPY | Facility: CLINIC | Age: 29
End: 2024-09-18
Payer: COMMERCIAL

## 2024-09-18 DIAGNOSIS — M25.561 CHRONIC PAIN OF RIGHT KNEE: Primary | ICD-10-CM

## 2024-09-18 DIAGNOSIS — G89.29 CHRONIC PAIN OF RIGHT KNEE: Primary | ICD-10-CM

## 2024-09-23 ENCOUNTER — TREATMENT (OUTPATIENT)
Dept: PHYSICAL THERAPY | Facility: CLINIC | Age: 29
End: 2024-09-23
Payer: COMMERCIAL

## 2024-09-23 DIAGNOSIS — G89.29 CHRONIC PAIN OF RIGHT KNEE: Primary | ICD-10-CM

## 2024-09-23 DIAGNOSIS — M25.561 CHRONIC PAIN OF RIGHT KNEE: Primary | ICD-10-CM

## 2024-09-23 PROCEDURE — 97110 THERAPEUTIC EXERCISES: CPT | Performed by: PHYSICAL THERAPIST

## 2024-09-23 PROCEDURE — 97112 NEUROMUSCULAR REEDUCATION: CPT | Performed by: PHYSICAL THERAPIST

## 2024-09-23 PROCEDURE — 97530 THERAPEUTIC ACTIVITIES: CPT | Performed by: PHYSICAL THERAPIST

## 2024-10-01 ENCOUNTER — TREATMENT (OUTPATIENT)
Dept: PHYSICAL THERAPY | Facility: CLINIC | Age: 29
End: 2024-10-01
Payer: COMMERCIAL

## 2024-10-01 DIAGNOSIS — M25.561 CHRONIC PAIN OF RIGHT KNEE: Primary | ICD-10-CM

## 2024-10-01 DIAGNOSIS — G89.29 CHRONIC PAIN OF RIGHT KNEE: Primary | ICD-10-CM

## 2024-10-01 PROCEDURE — 97110 THERAPEUTIC EXERCISES: CPT | Performed by: PHYSICAL THERAPIST

## 2024-10-01 PROCEDURE — 97112 NEUROMUSCULAR REEDUCATION: CPT | Performed by: PHYSICAL THERAPIST

## 2024-10-01 PROCEDURE — 97530 THERAPEUTIC ACTIVITIES: CPT | Performed by: PHYSICAL THERAPIST

## 2024-10-01 NOTE — PROGRESS NOTES
Physical Therapy Daily Treatment Note  [address], [address]  [place] [address]      Patient: Vernell Ornelas   : 1995  Referring practitioner: Matthew Jain *  Date of Initial Visit: Type: THERAPY  Noted: 2024  Today's Date: 10/1/2024  Patient seen for 5 sessions       Visit Diagnoses:    ICD-10-CM ICD-9-CM   1. Chronic pain of right knee  M25.561 719.46    G89.29 338.29           Subjective   Patient reports that day to day activities have gotten much better.  Currently denies pain.    Objective   See Exercise, Manual, and Modality Logs for complete treatment.       Assessment/Plan  Subjective reports are improved with knee pain during the course of the day, but patient continues to have some medial knee pain with hinging activities.  Added Palau[place] squat, supine bridge with HS curl and supine HS isometric for quad and hamstring which will improve stability of the knee with the hinge pattern.  Patient reported some pain in the medial knee with a few of the exercises, but was able to perform the desired amount.      Timed:         Manual Therapy:    0     mins  91821;     Therapeutic Exercise:    35     mins  81624;    Neuromuscular Bettye:    8    mins  87903;    Therapeutic Activity:     8     mins  86793;     Gait Training      0    mins  71096;  Work Conditioning     0   mins  74491       Untimed:  Electrical Stimulation:    0     mins  21872 ( );      Timed Treatment:   51   mins   Total Treatment:     51   mins    Keith Villalobos, PT  [place] License: [id number]

## 2024-10-02 ENCOUNTER — OFFICE VISIT (OUTPATIENT)
Dept: SPORTS MEDICINE | Facility: CLINIC | Age: 29
End: 2024-10-02
Payer: COMMERCIAL

## 2024-10-02 VITALS
SYSTOLIC BLOOD PRESSURE: 128 MMHG | HEIGHT: 62 IN | RESPIRATION RATE: 16 BRPM | HEART RATE: 72 BPM | BODY MASS INDEX: 23.92 KG/M2 | OXYGEN SATURATION: 98 % | DIASTOLIC BLOOD PRESSURE: 82 MMHG | WEIGHT: 130 LBS

## 2024-10-02 DIAGNOSIS — G89.29 CHRONIC PAIN OF RIGHT KNEE: ICD-10-CM

## 2024-10-02 DIAGNOSIS — M25.561 CHRONIC PAIN OF RIGHT KNEE: ICD-10-CM

## 2024-10-02 DIAGNOSIS — M84.351G STRESS FRACTURE OF RIGHT FEMUR WITH DELAYED HEALING, SUBSEQUENT ENCOUNTER: ICD-10-CM

## 2024-10-02 DIAGNOSIS — M94.261 CHONDROMALACIA OF MEDIAL CONDYLE OF RIGHT FEMUR: Primary | ICD-10-CM

## 2024-10-02 PROCEDURE — 99213 OFFICE O/P EST LOW 20 MIN: CPT | Performed by: FAMILY MEDICINE

## 2024-10-02 NOTE — PROGRESS NOTES
Vernell is a 29 y.o. year old female presents to [facility]    Chief Complaint   Patient presents with    Right Knee - Pain, Follow-up     F/u eval for RT knee - has been doing formal PT - reports - here for further evaluation and treatment        History of Present Illness  History of Present Illness  The patient presents for evaluation of knee pain.    She reports a slight improvement in her condition. Following the injection administered during her last visit, she experienced significant relief in her knee pain within two days. However, she notes that the pain fluctuates, with some days being better or worse than others. Despite not being completely pain-free, she acknowledges a noticeable improvement.    She is currently undergoing physical therapy, which she started around the same time as the injection. Over the past few weeks, she has noticed a standstill in her progress, with no significant improvement since a few days post-injection. The physical therapy sessions have become more challenging, and she experiences soreness in her knee during certain activities, particularly those involving hinging movements and deadlifting.    She also reports difficulty in climbing stairs, although she can now take the stairs at work without much discomfort. Occasionally, descending stairs causes mild pain, which alternates between the site of the initial fracture and behind the kneecap. She has not attempted running yet and does not feel ready to do so.    She is not taking any medication for the pain. She expresses optimism about her progress since December 2023, when the issue began. She is aware that she is eligible for another injection through her insurance in six months if the pain persists. She is no longer anxious about using stairs or walking, and she does not constantly guard her knee. She feels more natural and is not in constant pain, but she does not yet feel athletic.    I have  "reviewed the patient's medical, family, and social history in detail and updated the computerized patient record.    /82 (BP Location: Left arm, Patient Position: Sitting, Cuff Size: Adult)   Pulse 72   Resp 16   Ht 157.5 cm (62.01\")   Wt 59 kg (130 lb)   SpO2 98%   BMI 23.77 kg/m²      Physical Exam    Vital signs reviewed.   General: No acute distress.  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 Knee Pain.  The patient reports significant improvement in knee pain following the injection administered during the last visit. She continues to experience some pain, particularly during certain movements such as hinging, deadlifting, and descending stairs. Physical therapy (PT) has been ongoing, and while it has been challenging, it is unclear if it has contributed to the improvement. She is advised to continue with PT and strength training. She should start incorporating running into her regimen under the guidance of her physical therapist. If pain persists or worsens, she is eligible for another injection in approximately five months, and she should notify the office to begin the insurance process for the next injection.              Follow Up     Patient was given instructions and counseling regarding her condition or for health maintenance advice. Please see specific information pulled " into the AVS if appropriate.     Patient or patient representative verbalized consent for the use of Ambient Listening during the visit with  ZAIRA Jain Jr., DO for chart documentation. 10/2/2024  12:21 EDT

## 2024-10-03 DIAGNOSIS — M79.676 PAIN OF TOE, UNSPECIFIED LATERALITY: Primary | ICD-10-CM

## 2024-10-07 ENCOUNTER — TREATMENT (OUTPATIENT)
Dept: PHYSICAL THERAPY | Facility: CLINIC | Age: 29
End: 2024-10-07
Payer: COMMERCIAL

## 2024-10-07 DIAGNOSIS — G89.29 CHRONIC PAIN OF RIGHT KNEE: Primary | ICD-10-CM

## 2024-10-07 DIAGNOSIS — M25.561 CHRONIC PAIN OF RIGHT KNEE: Primary | ICD-10-CM

## 2024-10-07 PROCEDURE — 97530 THERAPEUTIC ACTIVITIES: CPT | Performed by: PHYSICAL THERAPIST

## 2024-10-07 PROCEDURE — 97110 THERAPEUTIC EXERCISES: CPT | Performed by: PHYSICAL THERAPIST

## 2024-10-07 PROCEDURE — 97112 NEUROMUSCULAR REEDUCATION: CPT | Performed by: PHYSICAL THERAPIST

## 2024-10-07 NOTE — PROGRESS NOTES
Physical Therapy Daily Treatment Note  [address], [address]  [place] [address]      Patient: Vernell Ornelas   : 1995  Referring practitioner: Matthew Jain *  Date of Initial Visit: Type: THERAPY  Noted: 2024  Today's Date: 10/7/2024  Patient seen for 6 sessions       Visit Diagnoses:    ICD-10-CM ICD-9-CM   1. Chronic pain of right knee  M25.561 719.46    G89.29 338.29           Subjective   Patient reports no pain in the knee currently.  States that she gets a lot of pain behind the kneecap with running.    Objective          Strength/Myotome Testing     Right Hip   Planes of Motion   Flexion: 4+    Right Knee   Flexion: 4+  Extension: 4+      See Exercise, Manual, and Modality Logs for complete treatment.       Assessment/Plan  Subjective reports continue to be good at rest, but continues to have pain with activities where the right knee is in slight flexion.  Added TKE with band to strengthen in the range that she has pain.  Patient tolerated the progression of exercises well, continues to have some pain in the knee if the knee is flexed to about 20 degrees.  Plan to continue to strengthen in the pain range.      Timed:         Manual Therapy:    0     mins  97432;     Therapeutic Exercise:    27     mins  26390;     Neuromuscular Bettye:    8    mins  67321;    Therapeutic Activity:     23     mins  84479;     Gait Training      0    mins  97892;  Work Conditioning     0   mins  94825       Untimed:  Electrical Stimulation:    0     mins  90682 ( );      Timed Treatment:   58   mins   Total Treatment:     58   mins    Keith Villalobos, PT  [place] License: [id number]

## 2024-10-14 ENCOUNTER — TREATMENT (OUTPATIENT)
Dept: PHYSICAL THERAPY | Facility: CLINIC | Age: 29
End: 2024-10-14
Payer: COMMERCIAL

## 2024-10-14 DIAGNOSIS — M25.561 CHRONIC PAIN OF RIGHT KNEE: Primary | ICD-10-CM

## 2024-10-14 DIAGNOSIS — G89.29 CHRONIC PAIN OF RIGHT KNEE: Primary | ICD-10-CM

## 2024-10-14 PROCEDURE — 97530 THERAPEUTIC ACTIVITIES: CPT | Performed by: PHYSICAL THERAPIST

## 2024-10-14 PROCEDURE — 97112 NEUROMUSCULAR REEDUCATION: CPT | Performed by: PHYSICAL THERAPIST

## 2024-10-14 PROCEDURE — 97110 THERAPEUTIC EXERCISES: CPT | Performed by: PHYSICAL THERAPIST

## 2024-10-14 NOTE — PROGRESS NOTES
Physical Therapy Daily Treatment Note  [address], [address]  [place] [address]      Patient: Vernell Ornelas   : 1995  Referring practitioner: Matthew Jain *  Date of Initial Visit: Type: THERAPY  Noted: 2024  Today's Date: 10/14/2024  Patient seen for 7 sessions       Visit Diagnoses:    ICD-10-CM ICD-9-CM   1. Chronic pain of right knee  M25.561 719.46    G89.29 338.29           Subjective   Patient reports no pain in the knee with activities during the day, but continues to have pain with jogging; immediate pain within the first couple steps.    Objective          Palpation     Additional Palpation Details  No TTP to the right knee.    Strength/Myotome Testing     Left Hip   Planes of Motion   Flexion: 4+  Abduction: 4+  Adduction: 4+  External rotation: 4+  Internal rotation: 4+    Right Hip   Planes of Motion   Flexion: 4+  Abduction: 4+  Adduction: 4+  External rotation: 4+  Internal rotation: 4+    Right Knee   Flexion: 4+  Extension: 4+    Tests     Right Knee   Negative bounce home, medial Zayda, Thessaly's test at 5 degrees, Thessaly's test at 20 degrees, valgus stress test at 0 degrees, valgus stress test at 30 degrees, varus stress test at 0 degrees and varus stress test at 30 degrees.       See Exercise, Manual, and Modality Logs for complete treatment.       Assessment/Plan  Subjective reports are improved with regard to performing activities during the day (gait, stairs), but continues to be unable to be as active as she would like.  Added gentle plyometric activities, which the patient tolerated with minimal pain.  From an objective standpoint, there are no limitations with AROM, strength, palpation or special testing, but the patient continues to have pain and limitations with certain activities.      Timed:         Manual Therapy:    0     mins  94972;     Therapeutic Exercise:    35     mins  25322;     Neuromuscular Bettye:    8    mins  81638;     Therapeutic Activity:     25     mins  20659;     Gait Training      0    mins  89278;  Work Conditioning     0   mins  30813       Untimed:  Electrical Stimulation:    0     mins  80983 ( );      Timed Treatment:   68   mins   Total Treatment:     68   mins    Keith Villalobos, PT  [place] License: [id number]

## 2024-10-18 ENCOUNTER — TELEPHONE (OUTPATIENT)
Dept: ORTHOPEDIC SURGERY | Facility: CLINIC | Age: 29
End: 2024-10-18

## 2024-10-18 NOTE — TELEPHONE ENCOUNTER
Caller: BROOK    Relationship to patient: SELF    Best call back number: [phone]    Chief complaint: RIGHT KNEE PAIN    Type of visit: ESTABLISHED WITH DR BRISENO SHOULDER WANTS TO BE SEEN FOR NEW PROBLEM RIGHT KNEE PAIN X 10 MTHS TRIED PHYSICAL THERAPY AND INJECTIONS WITH SPORTS MEDICINE DR GRIFFITH- ALL RECORDS IN CARE EVERYWHERE- NO PRIOR SX- XRAYS DONE AT [facility]- MRI'S X 2 -WANTS TO BE SEEN BY SURGEON- PHYSICAL THERAPIST RECOMMENDED DR BRISENO- PLEASE CALL-

## 2024-10-21 ENCOUNTER — TREATMENT (OUTPATIENT)
Dept: PHYSICAL THERAPY | Facility: CLINIC | Age: 29
End: 2024-10-21
Payer: COMMERCIAL

## 2024-10-21 DIAGNOSIS — G89.29 CHRONIC PAIN OF RIGHT KNEE: Primary | ICD-10-CM

## 2024-10-21 DIAGNOSIS — M25.561 CHRONIC PAIN OF RIGHT KNEE: Primary | ICD-10-CM

## 2024-10-21 PROCEDURE — 97110 THERAPEUTIC EXERCISES: CPT | Performed by: PHYSICAL THERAPIST

## 2024-10-21 PROCEDURE — 97112 NEUROMUSCULAR REEDUCATION: CPT | Performed by: PHYSICAL THERAPIST

## 2024-10-21 PROCEDURE — 97530 THERAPEUTIC ACTIVITIES: CPT | Performed by: PHYSICAL THERAPIST

## 2024-10-21 NOTE — PROGRESS NOTES
Physical Therapy Daily Treatment Note  [address] [facility] [facility], [address]  [place] [address]      Patient: Vernell Ornelas   : 1995  Referring practitioner: Matthew Jain *  Date of Initial Visit: Type: THERAPY  Noted: 2024  Today's Date: 10/21/2024  Patient seen for 8 sessions       Visit Diagnoses:    ICD-10-CM ICD-9-CM   1. Chronic pain of right knee  M25.561 719.46    G89.29 338.29         Subjective   Patient reports that the knee is worse since last visit.  Currently rates the pain at 4/10 at worse, but reports no pain at rest.    Objective   See Exercise, Manual, and Modality Logs for complete treatment.       Assessment/Plan  Subjective reports are increased from the previous visit.  Modified the exercise routine from the last visit.  Performed the star KT technique over the medial aspect of the right knee to help with the pain in this region.  Patient reported pain after the hinge, but had no pain during this exercise.  Patient had no pain in the knee with any of the other exercises.      Timed:         Manual Therapy:    0     mins  56693;     Therapeutic Exercise:    23     mins  17468;     Neuromuscular Bettye:    8    mins  60398;    Therapeutic Activity:     8     mins  20935;     Gait Training      0    mins  81299;  Work Conditioning     0   mins  93183       Untimed:  Electrical Stimulation:    0     mins  84322 ( );      Timed Treatment:   39   mins   Total Treatment:     39   mins    Keith Villalobos, PT  [place] License: [id number]

## 2024-11-08 ENCOUNTER — OFFICE VISIT (OUTPATIENT)
Dept: ORTHOPEDIC SURGERY | Facility: CLINIC | Age: 29
End: 2024-11-08
Payer: COMMERCIAL

## 2024-11-08 VITALS — TEMPERATURE: 98 F | WEIGHT: 129.4 LBS | BODY MASS INDEX: 23.81 KG/M2 | HEIGHT: 62 IN

## 2024-11-08 DIAGNOSIS — M95.8 OSTEOCHONDRAL DEFECT: Primary | ICD-10-CM

## 2024-11-08 DIAGNOSIS — M84.351A STRESS FRACTURE OF RIGHT FEMUR, INITIAL ENCOUNTER: ICD-10-CM

## 2024-11-08 PROCEDURE — 99214 OFFICE O/P EST MOD 30 MIN: CPT | Performed by: ORTHOPAEDIC SURGERY

## 2024-11-12 ENCOUNTER — TELEPHONE (OUTPATIENT)
Dept: ORTHOPEDIC SURGERY | Facility: CLINIC | Age: 29
End: 2024-11-12

## 2024-11-12 NOTE — TELEPHONE ENCOUNTER
Caller: Vernell Ornelas    Relationship to patient: Self    Best call back number: [phone]    Chief complaint:     M95.8 (ICD-10-CM) - Osteochondral defect  M84.351A (ICD-10-CM) - Stress fracture of right femur, initial       Type of visit: SURGERY      Additional notes:PT IS CALLING TO CHECK ON THE STATUS OF HER UPCOMING SURGERY DATE.    PLEASE CONTACT PT AND ADVISE

## 2024-11-13 PROBLEM — M84.351A STRESS FRACTURE OF RIGHT FEMUR: Status: ACTIVE | Noted: 2024-11-08

## 2024-11-13 PROBLEM — M95.8 OSTEOCHONDRAL DEFECT: Status: ACTIVE | Noted: 2024-11-08

## 2024-11-26 ENCOUNTER — DOCUMENTATION (OUTPATIENT)
Dept: PHYSICAL THERAPY | Facility: CLINIC | Age: 29
End: 2024-11-26
Payer: COMMERCIAL

## 2024-12-06 ENCOUNTER — PRE-ADMISSION TESTING (OUTPATIENT)
Dept: PREADMISSION TESTING | Facility: HOSPITAL | Age: 29
End: 2024-12-06
Payer: COMMERCIAL

## 2024-12-06 VITALS
RESPIRATION RATE: 16 BRPM | WEIGHT: 127.7 LBS | BODY MASS INDEX: 23.5 KG/M2 | TEMPERATURE: 97.5 F | HEART RATE: 82 BPM | HEIGHT: 62 IN | SYSTOLIC BLOOD PRESSURE: 131 MMHG | DIASTOLIC BLOOD PRESSURE: 74 MMHG | OXYGEN SATURATION: 99 %

## 2024-12-06 LAB
DEPRECATED RDW RBC AUTO: 42.4 FL (ref 37–54)
ERYTHROCYTE [DISTWIDTH] IN BLOOD BY AUTOMATED COUNT: 11.8 % (ref 12.3–15.4)
HCG SERPL QL: NEGATIVE
HCT VFR BLD AUTO: 44.6 % (ref 34–46.6)
HGB BLD-MCNC: 14.5 G/DL (ref 12–15.9)
MCH RBC QN AUTO: 31.5 PG (ref 26.6–33)
MCHC RBC AUTO-ENTMCNC: 32.5 G/DL (ref 31.5–35.7)
MCV RBC AUTO: 97 FL (ref 79–97)
PLATELET # BLD AUTO: 241 10*3/MM3 (ref 140–450)
PMV BLD AUTO: 10.5 FL (ref 6–12)
RBC # BLD AUTO: 4.6 10*6/MM3 (ref 3.77–5.28)
WBC NRBC COR # BLD AUTO: 5 10*3/MM3 (ref 3.4–10.8)

## 2024-12-06 PROCEDURE — 36415 COLL VENOUS BLD VENIPUNCTURE: CPT

## 2024-12-06 PROCEDURE — 85027 COMPLETE CBC AUTOMATED: CPT

## 2024-12-06 PROCEDURE — 84703 CHORIONIC GONADOTROPIN ASSAY: CPT | Performed by: ORTHOPAEDIC SURGERY

## 2024-12-06 NOTE — DISCHARGE INSTRUCTIONS
Take the following medications the morning of surgery:  NONE      If you are on prescription narcotic pain medication to control your pain you may also take that medication the morning of surgery.      General Instructions:     Do not eat solid food after midnight the night before surgery.  Clear liquids day of surgery are allowed but must be stopped at least two hours before your hospital arrival time.       Allowed clear liquids      Water, sodas, and tea or coffee with no cream or milk added.       12 to 20 ounces of a clear liquid that contains carbohydrates is recommended.  If non-diabetic, have Gatorade or Powerade.  If diabetic, have G2 or Powerade Zero.     Do not have liquids red in color.  Do not consume chicken, beef, pork or vegetable broth or bouillon cubes of any variety as they are not considered clear liquids and are not allowed.      Infants may have breast milk up to four hours before surgery.  Infants drinking formula may drink formula up to six hours before surgery.   Patients who avoid smoking, chewing tobacco and alcohol for 4 weeks prior to surgery have a reduced risk of post-operative complications.  Quit smoking as many days before surgery as you can.  Do not smoke, use chewing tobacco or drink alcohol the day of surgery.   If applicable bring your C-PAP/ BI-PAP machine in with you to preop day of surgery.  Bring any papers given to you in the doctor’s office.  Wear clean comfortable clothes.  Do not wear contact lenses, false eyelashes or make-up.  Bring a case for your glasses.   Bring crutches or walker if applicable.  Remove all piercings.  Leave jewelry and any other valuables at home.  Hair extensions with metal clips must be removed prior to surgery.  The Pre-Admission Testing nurse will instruct you to bring medications if unable to obtain an accurate list in Pre-Admission Testing.          Preventing a Surgical Site Infection:  For 2 to 3 days before surgery, avoid shaving with a  razor because the razor can irritate skin and make it easier to develop an infection.    Any areas of open skin can increase the risk of a post-operative wound infection by allowing bacteria to enter and travel throughout the body.  Notify your surgeon if you have any skin wounds / rashes even if it is not near the expected surgical site.  The area will need assessed to determine if surgery should be delayed until it is healed.  The night prior to surgery shower using a fresh bar of anti-bacterial soap (such as Dial) and clean washcloth.  Sleep in a clean bed with clean clothing.  Do not allow pets to sleep with you.  Shower on the morning of surgery using a fresh bar of anti-bacterial soap (such as Dial) and clean washcloth.  Dry with a clean towel and dress in clean clothing.  Ask your surgeon if you will be receiving antibiotics prior to surgery.  Make sure you, your family, and all healthcare providers clean their hands with soap and water or an alcohol based hand  before caring for you or your wound.    Day of surgery:  Your arrival time is approximately two hours before your scheduled surgery time.  Please note if you have an early arrival time the surgery doors do not open before 5:00 AM.  Upon arrival, a Pre-op nurse and Anesthesiologist will review your health history, obtain vital signs, and answer questions you may have.  The only belongings needed at this time will be a list of your home medications and if applicable your C-PAP/BI-PAP machine.  A Pre-op nurse will start an IV and you may receive medication in preparation for surgery, including something to help you relax.     Please be aware that surgery does come with discomfort.  We want to make every effort to control your discomfort so please discuss any uncontrolled symptoms with your nurse.   Your doctor will most likely have prescribed pain medications.      If you are going home after surgery you will receive individualized written care  instructions before being discharged.  A responsible adult must drive you to and from the hospital on the day of your surgery and ideally stay with you through the night.   .  Discharge prescriptions can be filled by the hospital pharmacy during regular pharmacy hours.  If you are having surgery late in the day/evening your prescription may be e-prescribed to your pharmacy.  Please verify your pharmacy hours or chose a 24 hour pharmacy to avoid not having access to your prescription because your pharmacy has closed for the day.    If you are staying overnight following surgery, you will be transported to your hospital room following the recovery period.  [facility] has all private rooms.    If you have any questions please call Pre-Admission Testing at [phone].  Deductibles and co-payments are collected on the day of service. Please be prepared to pay the required co-pay, deductible or deposit on the day of service as defined by your plan.    Call your surgeon immediately if you experience any of the following symptoms:  Sore Throat  Shortness of Breath or difficulty breathing  Cough  Chills  Body soreness or muscle pain  Headache  Fever  New loss of taste or smell  Do not arrive for your surgery ill.  Your procedure will need to be rescheduled to another time.  You will need to call your physician before the day of surgery to avoid any unnecessary exposure to hospital staff as well as other patients.

## 2024-12-10 ENCOUNTER — HOSPITAL ENCOUNTER (OUTPATIENT)
Facility: HOSPITAL | Age: 29
Setting detail: HOSPITAL OUTPATIENT SURGERY
Discharge: HOME | End: 2024-12-10
Attending: ORTHOPAEDIC SURGERY | Admitting: ORTHOPAEDIC SURGERY
Payer: COMMERCIAL

## 2024-12-10 ENCOUNTER — ANESTHESIA (OUTPATIENT)
Dept: PERIOP | Facility: HOSPITAL | Age: 29
End: 2024-12-10
Payer: COMMERCIAL

## 2024-12-10 ENCOUNTER — ANESTHESIA EVENT (OUTPATIENT)
Dept: PERIOP | Facility: HOSPITAL | Age: 29
End: 2024-12-10
Payer: COMMERCIAL

## 2024-12-10 VITALS
TEMPERATURE: 97.6 F | OXYGEN SATURATION: 100 % | DIASTOLIC BLOOD PRESSURE: 67 MMHG | RESPIRATION RATE: 14 BRPM | SYSTOLIC BLOOD PRESSURE: 116 MMHG | HEART RATE: 67 BPM

## 2024-12-10 DIAGNOSIS — M95.8 OSTEOCHONDRAL DEFECT: ICD-10-CM

## 2024-12-10 DIAGNOSIS — M84.351A STRESS FRACTURE OF RIGHT FEMUR, INITIAL ENCOUNTER: ICD-10-CM

## 2024-12-10 PROCEDURE — 25010000002 ONDANSETRON PER 1 MG: Performed by: NURSE ANESTHETIST, CERTIFIED REGISTERED

## 2024-12-10 PROCEDURE — 25010000002 KETOROLAC TROMETHAMINE PER 15 MG: Performed by: NURSE ANESTHETIST, CERTIFIED REGISTERED

## 2024-12-10 PROCEDURE — 25010000002 DEXAMETHASONE SODIUM PHOSPHATE 20 MG/5ML SOLUTION: Performed by: NURSE ANESTHETIST, CERTIFIED REGISTERED

## 2024-12-10 PROCEDURE — 25010000002 FENTANYL CITRATE (PF) 50 MCG/ML SOLUTION: Performed by: NURSE ANESTHETIST, CERTIFIED REGISTERED

## 2024-12-10 PROCEDURE — 29879 ARTHRS KNE SRG ABRASJ ARTHRP: CPT | Performed by: ORTHOPAEDIC SURGERY

## 2024-12-10 PROCEDURE — 25010000002 LIDOCAINE 2% SOLUTION: Performed by: NURSE ANESTHETIST, CERTIFIED REGISTERED

## 2024-12-10 PROCEDURE — 25010000002 CEFAZOLIN PER 500 MG: Performed by: ORTHOPAEDIC SURGERY

## 2024-12-10 PROCEDURE — 29881 ARTHRS KNE SRG MNISECTMY M/L: CPT | Performed by: ORTHOPAEDIC SURGERY

## 2024-12-10 PROCEDURE — 25810000003 LACTATED RINGERS PER 1000 ML: Performed by: STUDENT IN AN ORGANIZED HEALTH CARE EDUCATION/TRAINING PROGRAM

## 2024-12-10 PROCEDURE — 25010000002 PROPOFOL 10 MG/ML EMULSION: Performed by: NURSE ANESTHETIST, CERTIFIED REGISTERED

## 2024-12-10 RX ORDER — FENTANYL CITRATE 50 UG/ML
INJECTION, SOLUTION INTRAMUSCULAR; INTRAVENOUS AS NEEDED
Status: DISCONTINUED | OUTPATIENT
Start: 2024-12-10 | End: 2024-12-10 | Stop reason: SURG

## 2024-12-10 RX ORDER — KETOROLAC TROMETHAMINE 30 MG/ML
INJECTION, SOLUTION INTRAMUSCULAR; INTRAVENOUS AS NEEDED
Status: DISCONTINUED | OUTPATIENT
Start: 2024-12-10 | End: 2024-12-10 | Stop reason: SURG

## 2024-12-10 RX ORDER — SODIUM CHLORIDE 0.9 % (FLUSH) 0.9 %
3 SYRINGE (ML) INJECTION EVERY 12 HOURS SCHEDULED
Status: DISCONTINUED | OUTPATIENT
Start: 2024-12-10 | End: 2024-12-10 | Stop reason: HOSPADM

## 2024-12-10 RX ORDER — EPHEDRINE SULFATE 50 MG/ML
5 INJECTION, SOLUTION INTRAVENOUS ONCE AS NEEDED
Status: DISCONTINUED | OUTPATIENT
Start: 2024-12-10 | End: 2024-12-10 | Stop reason: HOSPADM

## 2024-12-10 RX ORDER — ATROPINE SULFATE 0.4 MG/ML
0.4 INJECTION, SOLUTION INTRAMUSCULAR; INTRAVENOUS; SUBCUTANEOUS ONCE AS NEEDED
Status: DISCONTINUED | OUTPATIENT
Start: 2024-12-10 | End: 2024-12-10 | Stop reason: HOSPADM

## 2024-12-10 RX ORDER — FLUMAZENIL 0.1 MG/ML
0.2 INJECTION INTRAVENOUS AS NEEDED
Status: DISCONTINUED | OUTPATIENT
Start: 2024-12-10 | End: 2024-12-10 | Stop reason: HOSPADM

## 2024-12-10 RX ORDER — MIDAZOLAM HYDROCHLORIDE 1 MG/ML
1 INJECTION, SOLUTION INTRAMUSCULAR; INTRAVENOUS
Status: DISCONTINUED | OUTPATIENT
Start: 2024-12-10 | End: 2024-12-10 | Stop reason: HOSPADM

## 2024-12-10 RX ORDER — DROPERIDOL 2.5 MG/ML
0.62 INJECTION, SOLUTION INTRAMUSCULAR; INTRAVENOUS
Status: DISCONTINUED | OUTPATIENT
Start: 2024-12-10 | End: 2024-12-10 | Stop reason: HOSPADM

## 2024-12-10 RX ORDER — PROMETHAZINE HYDROCHLORIDE 25 MG/1
25 TABLET ORAL ONCE AS NEEDED
Status: DISCONTINUED | OUTPATIENT
Start: 2024-12-10 | End: 2024-12-10 | Stop reason: HOSPADM

## 2024-12-10 RX ORDER — ONDANSETRON 2 MG/ML
INJECTION INTRAMUSCULAR; INTRAVENOUS AS NEEDED
Status: DISCONTINUED | OUTPATIENT
Start: 2024-12-10 | End: 2024-12-10 | Stop reason: SURG

## 2024-12-10 RX ORDER — LIDOCAINE HYDROCHLORIDE 10 MG/ML
0.5 INJECTION, SOLUTION INFILTRATION; PERINEURAL ONCE AS NEEDED
Status: DISCONTINUED | OUTPATIENT
Start: 2024-12-10 | End: 2024-12-10 | Stop reason: HOSPADM

## 2024-12-10 RX ORDER — FENTANYL CITRATE 50 UG/ML
50 INJECTION, SOLUTION INTRAMUSCULAR; INTRAVENOUS
Status: DISCONTINUED | OUTPATIENT
Start: 2024-12-10 | End: 2024-12-10 | Stop reason: HOSPADM

## 2024-12-10 RX ORDER — DIPHENHYDRAMINE HYDROCHLORIDE 50 MG/ML
12.5 INJECTION INTRAMUSCULAR; INTRAVENOUS
Status: DISCONTINUED | OUTPATIENT
Start: 2024-12-10 | End: 2024-12-10 | Stop reason: HOSPADM

## 2024-12-10 RX ORDER — PROMETHAZINE HYDROCHLORIDE 25 MG/1
25 SUPPOSITORY RECTAL ONCE AS NEEDED
Status: DISCONTINUED | OUTPATIENT
Start: 2024-12-10 | End: 2024-12-10 | Stop reason: HOSPADM

## 2024-12-10 RX ORDER — SODIUM CHLORIDE 0.9 % (FLUSH) 0.9 %
3-10 SYRINGE (ML) INJECTION AS NEEDED
Status: DISCONTINUED | OUTPATIENT
Start: 2024-12-10 | End: 2024-12-10 | Stop reason: HOSPADM

## 2024-12-10 RX ORDER — NALOXONE HCL 0.4 MG/ML
0.2 VIAL (ML) INJECTION AS NEEDED
Status: DISCONTINUED | OUTPATIENT
Start: 2024-12-10 | End: 2024-12-10 | Stop reason: HOSPADM

## 2024-12-10 RX ORDER — HYDROMORPHONE HYDROCHLORIDE 1 MG/ML
0.5 INJECTION, SOLUTION INTRAMUSCULAR; INTRAVENOUS; SUBCUTANEOUS
Status: DISCONTINUED | OUTPATIENT
Start: 2024-12-10 | End: 2024-12-10 | Stop reason: HOSPADM

## 2024-12-10 RX ORDER — HYDROCODONE BITARTRATE AND ACETAMINOPHEN 5; 325 MG/1; MG/1
1 TABLET ORAL EVERY 4 HOURS PRN
Qty: 12 TABLET | Refills: 0 | Status: SHIPPED | OUTPATIENT
Start: 2024-12-10

## 2024-12-10 RX ORDER — IPRATROPIUM BROMIDE AND ALBUTEROL SULFATE 2.5; .5 MG/3ML; MG/3ML
3 SOLUTION RESPIRATORY (INHALATION) ONCE AS NEEDED
Status: DISCONTINUED | OUTPATIENT
Start: 2024-12-10 | End: 2024-12-10 | Stop reason: HOSPADM

## 2024-12-10 RX ORDER — SODIUM CHLORIDE, SODIUM LACTATE, POTASSIUM CHLORIDE, AND CALCIUM CHLORIDE .6; .31; .03; .02 G/100ML; G/100ML; G/100ML; G/100ML
IRRIGANT IRRIGATION AS NEEDED
Status: DISCONTINUED | OUTPATIENT
Start: 2024-12-10 | End: 2024-12-10 | Stop reason: HOSPADM

## 2024-12-10 RX ORDER — LABETALOL HYDROCHLORIDE 5 MG/ML
5 INJECTION, SOLUTION INTRAVENOUS
Status: DISCONTINUED | OUTPATIENT
Start: 2024-12-10 | End: 2024-12-10 | Stop reason: HOSPADM

## 2024-12-10 RX ORDER — OXYCODONE AND ACETAMINOPHEN 7.5; 325 MG/1; MG/1
1 TABLET ORAL EVERY 4 HOURS PRN
Status: DISCONTINUED | OUTPATIENT
Start: 2024-12-10 | End: 2024-12-10 | Stop reason: HOSPADM

## 2024-12-10 RX ORDER — SODIUM CHLORIDE, SODIUM LACTATE, POTASSIUM CHLORIDE, CALCIUM CHLORIDE 600; 310; 30; 20 MG/100ML; MG/100ML; MG/100ML; MG/100ML
9 INJECTION, SOLUTION INTRAVENOUS CONTINUOUS
Status: DISCONTINUED | OUTPATIENT
Start: 2024-12-10 | End: 2024-12-10 | Stop reason: HOSPADM

## 2024-12-10 RX ORDER — HYDROCODONE BITARTRATE AND ACETAMINOPHEN 5; 325 MG/1; MG/1
1 TABLET ORAL ONCE AS NEEDED
Status: DISCONTINUED | OUTPATIENT
Start: 2024-12-10 | End: 2024-12-10 | Stop reason: HOSPADM

## 2024-12-10 RX ORDER — LIDOCAINE HYDROCHLORIDE 20 MG/ML
INJECTION, SOLUTION INFILTRATION; PERINEURAL AS NEEDED
Status: DISCONTINUED | OUTPATIENT
Start: 2024-12-10 | End: 2024-12-10 | Stop reason: SURG

## 2024-12-10 RX ORDER — DEXAMETHASONE SODIUM PHOSPHATE 4 MG/ML
INJECTION, SOLUTION INTRA-ARTICULAR; INTRALESIONAL; INTRAMUSCULAR; INTRAVENOUS; SOFT TISSUE AS NEEDED
Status: DISCONTINUED | OUTPATIENT
Start: 2024-12-10 | End: 2024-12-10 | Stop reason: SURG

## 2024-12-10 RX ORDER — HYDRALAZINE HYDROCHLORIDE 20 MG/ML
5 INJECTION INTRAMUSCULAR; INTRAVENOUS
Status: DISCONTINUED | OUTPATIENT
Start: 2024-12-10 | End: 2024-12-10 | Stop reason: HOSPADM

## 2024-12-10 RX ORDER — ACETAMINOPHEN 500 MG
1000 TABLET ORAL ONCE
Status: COMPLETED | OUTPATIENT
Start: 2024-12-10 | End: 2024-12-10

## 2024-12-10 RX ORDER — ONDANSETRON 2 MG/ML
4 INJECTION INTRAMUSCULAR; INTRAVENOUS ONCE AS NEEDED
Status: DISCONTINUED | OUTPATIENT
Start: 2024-12-10 | End: 2024-12-10 | Stop reason: HOSPADM

## 2024-12-10 RX ORDER — PROPOFOL 10 MG/ML
VIAL (ML) INTRAVENOUS AS NEEDED
Status: DISCONTINUED | OUTPATIENT
Start: 2024-12-10 | End: 2024-12-10 | Stop reason: SURG

## 2024-12-10 RX ADMIN — ACETAMINOPHEN 1000 MG: 500 TABLET ORAL at 07:06

## 2024-12-10 RX ADMIN — DEXAMETHASONE SODIUM PHOSPHATE 8 MG: 4 INJECTION, SOLUTION INTRAMUSCULAR; INTRAVENOUS at 08:00

## 2024-12-10 RX ADMIN — PROPOFOL 50 MG: 10 INJECTION, EMULSION INTRAVENOUS at 07:43

## 2024-12-10 RX ADMIN — FENTANYL CITRATE 50 MCG: 50 INJECTION, SOLUTION INTRAMUSCULAR; INTRAVENOUS at 07:56

## 2024-12-10 RX ADMIN — KETOROLAC TROMETHAMINE 30 MG: 30 INJECTION, SOLUTION INTRAMUSCULAR at 08:14

## 2024-12-10 RX ADMIN — ONDANSETRON 4 MG: 2 INJECTION INTRAMUSCULAR; INTRAVENOUS at 08:00

## 2024-12-10 RX ADMIN — SODIUM CHLORIDE 2 G: 900 INJECTION INTRAVENOUS at 07:30

## 2024-12-10 RX ADMIN — LIDOCAINE HYDROCHLORIDE 60 MG: 20 INJECTION, SOLUTION INFILTRATION; PERINEURAL at 07:42

## 2024-12-10 RX ADMIN — SODIUM CHLORIDE, POTASSIUM CHLORIDE, SODIUM LACTATE AND CALCIUM CHLORIDE: 600; 310; 30; 20 INJECTION, SOLUTION INTRAVENOUS at 08:19

## 2024-12-10 NOTE — OP NOTE
Operative Note      Facility: [facility]  Patient Name: Vernell Ornelas  YOB: 1995  Date: 12/10/2024  Medical Record Number: [id number]      Pre-op Diagnosis:   Osteochondral defect [M95.8]  Stress fracture of right femur, initial encounter [M84.351A]    Post-Op Diagnosis Codes:     * Osteochondral defect [M95.8]     * Stress fracture of right femur, initial encounter [M84.351A]  Central tear lateral meniscus small grade 4 defect medial femoral condyle most posterior aspect measuring 3 x 2 mm  Procedure(s):  KNEE ARTHROSCOPY with partial lateral menisectomy and debridement of medial condyle    Surgeon(s):  Pastora Underwood MD    Anesthesia: General  Anesthesiologist: Luis Santiago MD  CRNA: Kathy Hernandez CRNA    Staff:   Circulator: Alok Priest RN  Scrub Person: Natalia Hubbard RN  Vendor Representative: Wilfredo Meek; Yao Baeza    Assistants : none      Estimated Blood Loss: 5 mL    Specimens:    none     Drains: None    Findings: See Dictation    Complications: None      Indication for procedure: This patient has had a several month history of knee pain and has an exam and an MRI which are consistent with meniscal pathology. They understand all options and wish to proceed with arthroscopy.      Description of procedure: The patient was taken to the operating room. They were placed supine on the operating room table. After induction of adequate LMA anesthesia, IV antibiotics the patient underwent exam under anesthesia with symmetric full range of motion. Nonsterile tourniquet was applied patient was placed in the thigh waters all prominent areas were well padded and end of the table dropped. The leg was prepped and draped in usual sterile fashion. Standard lateral incision was made with 11 blade. Blunt trocar penetrated into the joint, scope followed and the evaluation began.  Patella appeared to sit centrally within the trochlear groove the cartilage was  intact on both.  I then entered the medial compartment or spinal needle localization direct visualization a medial portal was established she did have an area in the medial femoral condyle that appeared abnormal and probing this was loose this was then gently debrided with a motorized shaver and there was an area of 3 x 2 mm that was full-thickness.  I did gently debride the bone so as hopefully to get some medullary blood to form a little bit of overlying cartilage.  The ACL PCL were normal.  I then entered the lateral compartment she had a small central tear lateral meniscus that was gently debrided with a motorized shaver the Apollo device the cartilage was intact on the femur and the tibia            At this point everything was thoroughly irrigated it was suctioned all 3 compartments, the gutters the suprapatellar pouch were all evaluated there was no further acute pathology seen.  Everything was thoroughly irrigated it was injected with Marcaine.  The portals were closed with 3-0 nylon interrupted fashion.  Sterile dressings and Ace wraps were applied.  The patient tolerated the procedure well and was taken to recovery room in good condition.  All sponge and needle counts were correct.                    Date: 12/10/2024  Time: 08:26 EST

## 2024-12-10 NOTE — ANESTHESIA POSTPROCEDURE EVALUATION
Patient: Vernell Ornelas    Procedure Summary       Date: 12/10/24 Room / Location: [facility] OR  / [facility] [place] [facility] [place]    Anesthesia Start: 0736 Anesthesia Stop: 0826    Procedure: KNEE ARTHROSCOPY with partial lateral menisectomy and debridement of medial condyle (Right: Knee) Diagnosis:       Osteochondral defect      Stress fracture of right femur, initial encounter      (Osteochondral defect [M95.8])      (Stress fracture of right femur, initial encounter [M84.351A])    Surgeons: Pastora Underwood MD Provider: Luis Santiago MD    Anesthesia Type: general ASA Status: 1            Anesthesia Type: general    Vitals  Vitals Value Taken Time   /79 12/10/24 0845   Temp 36.5 °C (97.7 °F) 12/10/24 0845   Pulse 72 12/10/24 0852   Resp 12 12/10/24 0845   SpO2 99 % 12/10/24 0852   Vitals shown include unfiled device data.        Post Anesthesia Care and Evaluation    Patient location during evaluation: bedside  Patient participation: complete - patient participated  Level of consciousness: awake  Pain management: adequate    Airway patency: patent  Anesthetic complications: No anesthetic complications  PONV Status: controlled  Cardiovascular status: acceptable  Respiratory status: acceptable  Hydration status: acceptable    Comments: ---------------------------               12/10/24                      0903         ---------------------------   BP:          116/67         Pulse:         67           Resp:          14           Temp:   36.4 °C (97.6 °F)   SpO2:         100%         ---------------------------

## 2024-12-10 NOTE — ANESTHESIA PROCEDURE NOTES
Airway  Urgency: elective    Date/Time: 12/10/2024 7:43 AM  Airway not difficult    General Information and Staff    Patient location during procedure: OR  CRNA/CAA: Kathy Hernandez CRNA    Indications and Patient Condition  Indications for airway management: airway protection    Preoxygenated: yes  Mask difficulty assessment: 1 - vent by mask    Final Airway Details  Final airway type: supraglottic airway      Successful airway: classic  Size 4     Number of attempts at approach: 1  Assessment: lips, teeth, and gum same as pre-op    Additional Comments  Smooth IV induction. LMA inserted with ease. Cuff up. LMA secured BEBS

## 2024-12-10 NOTE — H&P
History & Physical       Patient: Vernell Ornelas    Date of Admission: 12/10/2024  6:46 AM    YOB: 1995    Medical Record Number: [id number]    Attending Physician: Pastora Underwood MD        Chief Complaints: Osteochondral defect [M95.8]  Stress fracture of right femur, initial encounter [M84.351A]      History of Present Illness: This is a 29-year-old female who is an avid runner who presents complaining of right knee pain been ongoing for 1 year.  She has an MRI earlier this year that showed a medial femoral condyle stress fracture as well as small areas of full-thickness cartilage loss.  She does have true mechanical symptoms and is failed conservative management.  She presents for arthroscopy probably debridement of the lesion, far less likely subchondral plasty.  She understands can by no means guarantee we will resolve her symptoms but I am hoping we can make some of it better     Allergies: No Known Allergies    Medications:   Home Medications:  No current facility-administered medications on file prior to encounter.     No current outpatient medications on file prior to encounter.     Current Medications:  Scheduled Meds:ceFAZolin, 2 g, Intravenous, Once  sodium chloride, 3 mL, Intravenous, Q12H      Continuous Infusions:lactated ringers, 9 mL/hr, Last Rate: 9 mL/hr (12/10/24 0702)      PRN Meds:.  lidocaine    midazolam    sodium chloride    Past Medical History:   Diagnosis Date    Abnormal Pap smear of cervix     Osteochondral defect     Right knee pain         Past Surgical History:   Procedure Laterality Date    CYST REMOVAL      LEFT FOOT -AGE 19    ENDOSCOPY      WISDOM TOOTH EXTRACTION          Social History     Occupational History    Not on file   Tobacco Use    Smoking status: Never    Smokeless tobacco: Never   Vaping Use    Vaping status: Never Used   Substance and Sexual Activity    Alcohol use: Yes     Comment: SOCIAL    Drug use: No    Sexual activity: Yes      Partners: Male     Birth control/protection: Vasectomy      Social History     Social History Narrative    Not on file        Family History   Problem Relation Age of Onset    Hypertension Mother     Thyroid disease Mother     Depression Mother     Anxiety disorder Mother     Heart disease Maternal Grandmother     Anxiety disorder Maternal Grandfather     Deep vein thrombosis Paternal Grandfather     Hypertension Other     Heart disease Other     Breast cancer Neg Hx     Ovarian cancer Neg Hx     Colon cancer Neg Hx     Pulmonary embolism Neg Hx     Birth defects Neg Hx     Mental retardation Neg Hx     Malig Hyperthermia Neg Hx        Review of Systems      Physical Exam: 29 y.o. female  General Appearance:    Alert, cooperative, in no acute distress                      Vitals:    12/10/24 0655   BP: 125/69   BP Location: Left arm   Patient Position: Sitting   Pulse: 84   Resp: 18   Temp: 98.4 °F (36.9 °C)   TempSrc: Oral   SpO2: 100%        Head:  Normocephalic, without obvious abnormality, atraumatic   Eyes:          Conjunctivae and sclerae normal, no pallor, corneas clear,    Ears:  Ears appear intact with no abnormalities noted   Throat: No oral lesions, no thrush, oral mucosa moist   Neck: No adenopathy, supple, trachea midline, no thyromegaly,    Back:   No kyphosis present, no scoliosis present, no skin lesions,      erythema or scars, no tenderness to percussion or                   palpation,range of motion normal   Lungs:   C respirations regular, even and                 unlabored    Heart:  Regular rhythm and normal rate               Chest Wall:  No abnormalities observed               Pulses: Pulses palpable and equal bilaterally   Skin: No bleeding, bruising or rash   Lymph nodes: No palpable adenopathy   Neurologic: Appears neurologic intact             Assessment:    Osteochondral defect    Stress fracture of right femur          Plan: All risks, benefits and alternatives were discussed.  Risks  including but not exclusive to anesthetic complications, including death, MI, CVA, infection, bleeding DVT, PE,  fracture, residual pain and need for future surgery.  Patient understood all and agrees to proceed.

## 2024-12-24 ENCOUNTER — OFFICE VISIT (OUTPATIENT)
Dept: ORTHOPEDIC SURGERY | Facility: CLINIC | Age: 29
End: 2024-12-24
Payer: COMMERCIAL

## 2024-12-24 VITALS — HEIGHT: 62 IN | TEMPERATURE: 97.5 F | BODY MASS INDEX: 24.55 KG/M2 | WEIGHT: 133.4 LBS

## 2024-12-24 DIAGNOSIS — Z98.890 S/P ARTHROSCOPY OF KNEE: Primary | ICD-10-CM

## 2024-12-24 RX ORDER — MELOXICAM 15 MG/1
TABLET ORAL
Qty: 30 TABLET | Refills: 0 | Status: SHIPPED | OUTPATIENT
Start: 2024-12-24

## 2024-12-24 NOTE — PROGRESS NOTES
Knee Scope follow Up 1st Visit      Patient: Vernell Ornelas        YOB: 1995      Chief Complaints: knee pain right      History of Present Illness: Pt is here f/u knee arthroscopy on the right knee she states she still little stiff having some pain laterally overall doing fine no        Allergies: No Known Allergies    Medications:   Home Medications:  Current Outpatient Medications on File Prior to Visit   Medication Sig    HYDROcodone-acetaminophen (NORCO) 5-325 MG per tablet Take 1 tablet by mouth Every 4 (Four) Hours As Needed for Severe Pain.     No current facility-administered medications on file prior to visit.     Current Medications:  Scheduled Meds:  Continuous Infusions:No current facility-administered medications for this visit.    PRN Meds:.          Physical Exam: 29 y.o. female  General Appearance:    Alert, cooperative, in no acute distress                 There were no vitals filed for this visit.   Patient is alert and oriented ×3 no acute distress normal mood physical exam.  Physical exam of the knee, incisions looked good there is no erythema, calf is soft and non-tender.  No sign or sx of DVT      Assessment  S/P knee scope.  I did review intraoperative findings and arthroscopic pictures with the patient.          Plan: To remove sutures today place Steri-Strips and start into  physical therapy and I will have thrm follow up in 4 weeks.  I will fill out her  form think it is imperative she get stronger quads and core might consider a medial  when she returns in a month

## 2025-01-09 ENCOUNTER — TREATMENT (OUTPATIENT)
Dept: PHYSICAL THERAPY | Facility: CLINIC | Age: 30
End: 2025-01-09
Payer: COMMERCIAL

## 2025-01-09 DIAGNOSIS — R26.2 DIFFICULTY WALKING: ICD-10-CM

## 2025-01-09 DIAGNOSIS — M25.561 RIGHT KNEE PAIN, UNSPECIFIED CHRONICITY: Primary | ICD-10-CM

## 2025-01-09 DIAGNOSIS — S83.281D ACUTE TEAR LATERAL MENISCUS, RIGHT, SUBSEQUENT ENCOUNTER: ICD-10-CM

## 2025-01-09 NOTE — PROGRESS NOTES
Physical Therapy Initial Evaluation and Plan of Care  [address] [facility], [address]  [place] [address]    Patient: Vernell Ornelas   : 1995  Diagnosis/ICD-10 Code:  Right knee pain, unspecified chronicity [M25.561]  Referring practitioner: Pastora Underwood MD  Date of Initial Visit: 2025  Today's Date: 2025  Patient seen for 1 session         Visit Diagnoses:    ICD-10-CM ICD-9-CM   1. Right knee pain, unspecified chronicity  M25.561 719.46   2. Acute tear lateral meniscus, right, subsequent encounter  S83.281D V58.89     836.1   3. Difficulty walking  R26.2 719.7         Subjective Questionnaire: WOMAC 10      Subjective Evaluation    History of Present Illness  Mechanism of injury: Patient is a 29 year old female who presents following right knee surgery on 12/10/24 to repair a lateral meniscus tear and medial meniscus tear.  Patient was seen at our facility from Aug to 2024 but the symptoms persisted.  Patient saw ortho and had surgery.    Patient was on crutches for 6 days.  Patient reports having difficulty straightening her leg initially due to right lateral/posterior knee pain.  Initially this woke her up at night, but this is improving.  Reports an increase in tightness in this area in the morning.    Reports being able to do most things around the house and returned to the gym doing some light leg work.    Reports being about 20% better since before the surgery.  Reports that the MD will get her a deloader brace at her next visit.    No previous knee surgeries.  Denies medical issues.      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  Patient ambulates with a normal gait pattern.    Palpation     Additional Palpation Details  TTP to the right lateral HS insertion.    Active Range of Motion     Right Hip   Flexion: Right hip active flexion: WNL.   Extension: Right hip active extension: WNL.   Abduction: Right hip active abduction: WNL.   Adduction: Right hip active adduction: WNL.   External rotation (90/90): Right hip active external rotation 90/90: WNL.   Internal rotation (90/90): Right hip active internal rotation 90/90: WNL.     Right Knee   Normal active range of motion    Strength/Myotome Testing     Left Hip   Planes of Motion   External rotation: 4+  Internal rotation: 4+    Right Hip   Planes of Motion   Flexion: 4+  Abduction: 5  Adduction: 5  External rotation: 4  Internal rotation: 4+    Left Knee   Flexion: 4+  Extension: 5    Right Knee   Flexion: 4+  Extension: 4    Tests     Additional Tests Details  N/T secondary to healing restraints.          Assessment & Plan       Assessment  Impairments: activity intolerance, impaired physical strength, lacks appropriate home exercise program and pain with function   Assessment details: Patient is a 29 year old female who presents following right knee surgery with reports of pain, TTP and decreased hip and knee strength which is limiting her ability to perform activities, including returning to an active lifestyle.  Barriers to therapy: none  Prognosis: good  Prognosis details: STG's to be met by 2 weeks  1)  Independent with HEP to show compliance  2)  Decrease pain by 50% or more to allow patient to perform self care and activities more comfortably  3)  Min to no TTP present to indicate improved healing response  4)  MMT right hip and knee to 4+/5 for improved stability with gait and stairs    LTG's to be met by 4 weeks  1)  Independent with HEP progression to show continued  compliance  2)  Decrease pain by 75% or more to allow patient to return to activities and hobbies with minimal limitations   3)  No TTP to indicate improved healing response  4)  Patient to return to light to medium weight lower body strengthening at the gym  5)  Improve WOMAC score to 8 or less        Plan  Therapy options: will be seen for skilled therapy services  Planned therapy interventions: strengthening, stretching, therapeutic activities, home exercise program, manual therapy and neuromuscular re-education  Frequency: 2x week  Duration in weeks: 4  Treatment plan discussed with: patient            Timed:         Manual Therapy:    0     mins  09180;     Therapeutic Exercise:    26     mins  53461;    Neuromuscular Bettye:    0    mins  53708;    Therapeutic Activity:     10     mins  47038;     Gait Trainin     mins  65053;     Ultrasound:     0     mins  01124;          Un-Timed:  Electrical Stimulation:    0     mins  83782 ( );    Low Eval     22     Mins  98347  Mod Eval     0     Mins  99632  High Eval                       0     Mins  92486        Timed Treatment:   36   mins   Total Treatment:     58   mins          PT: Keith Villalobos PT     [place] License [id number]  Electronically signed by Keith Villalobos PT, 25, 8:32 AM EST    Certification Period: 2025 thru 2025  I certify that the therapy services are furnished while this patient is under my care.  The services outlined above are required by this patient, and will be reviewed every 90 days.    Pastora Underwood Md  [address] [place] [address]   NPI: [id number]      Keith Villalobos PT   License number: [id number]        Physician Signature:__________________________________________________    PHYSICIAN: Pastora Underwood MD      DATE:     Please sign and return via fax to [email or web address] . Thank you, [facility].

## 2025-01-13 ENCOUNTER — TREATMENT (OUTPATIENT)
Dept: PHYSICAL THERAPY | Facility: CLINIC | Age: 30
End: 2025-01-13
Payer: COMMERCIAL

## 2025-01-13 DIAGNOSIS — M25.561 RIGHT KNEE PAIN, UNSPECIFIED CHRONICITY: Primary | ICD-10-CM

## 2025-01-13 DIAGNOSIS — S83.281D ACUTE TEAR LATERAL MENISCUS, RIGHT, SUBSEQUENT ENCOUNTER: ICD-10-CM

## 2025-01-13 PROCEDURE — 97110 THERAPEUTIC EXERCISES: CPT | Performed by: PHYSICAL THERAPIST

## 2025-01-13 PROCEDURE — 97530 THERAPEUTIC ACTIVITIES: CPT | Performed by: PHYSICAL THERAPIST

## 2025-01-13 NOTE — PROGRESS NOTES
Physical Therapy Daily Treatment Note  [address] [facility], [address]  [place] [address]      Patient: Vernell Ornelas   : 1995  Referring practitioner: Pastora Underwood MD  Date of Initial Visit: Type: THERAPY  Noted: 2025  Today's Date: 2025  Patient seen for 2 sessions       Visit Diagnoses:    ICD-10-CM ICD-9-CM   1. Right knee pain, unspecified chronicity  M25.561 719.46   2. Acute tear lateral meniscus, right, subsequent encounter  S83.281D V58.89     836.1         Subjective   Patient reports no pain in the knee.  Continues to have soreness after sitting long periods.    Objective   See Exercise, Manual, and Modality Logs for complete treatment.       Assessment/Plan  Subjective reports continue to be good with regard to pain.  Added lateral step downs, Indian squats and step matrix for hip/knee strengthening and proprioception which will improve stability when ambulating on uneven terrain.  Patient performed the routine without a significant increase in pain.  Patient was able to perform light jumping activities without any pain.      Timed:         Manual Therapy:    0     mins  84070;     Therapeutic Exercise:    31     mins  05794;     Neuromuscular Bettye:    0    mins  18290;    Therapeutic Activity:     10     mins  08456;     Gait Training      0    mins  51436;  Work Conditioning     0   mins  45314       Untimed:  Electrical Stimulation:    0     mins  43262 ( );      Timed Treatment:   41   mins   Total Treatment:     41   mins    Keith Villalobos, PT  [place] License: [id number]

## 2025-01-16 ENCOUNTER — TREATMENT (OUTPATIENT)
Dept: PHYSICAL THERAPY | Facility: CLINIC | Age: 30
End: 2025-01-16
Payer: COMMERCIAL

## 2025-01-16 DIAGNOSIS — R26.2 DIFFICULTY WALKING: ICD-10-CM

## 2025-01-16 DIAGNOSIS — S83.281D ACUTE TEAR LATERAL MENISCUS, RIGHT, SUBSEQUENT ENCOUNTER: ICD-10-CM

## 2025-01-16 DIAGNOSIS — M25.561 RIGHT KNEE PAIN, UNSPECIFIED CHRONICITY: Primary | ICD-10-CM

## 2025-01-16 NOTE — PROGRESS NOTES
Physical Therapy Daily Treatment Note  [address] [facility], [address]  [place] [address]      Patient: Vernell Ornelas   : 1995  Referring practitioner: Pastora Underwood MD  Date of Initial Visit: Type: THERAPY  Noted: 2025  Today's Date: 2025  Patient seen for 3 sessions       Visit Diagnoses:    ICD-10-CM ICD-9-CM   1. Right knee pain, unspecified chronicity  M25.561 719.46   2. Acute tear lateral meniscus, right, subsequent encounter  S83.281D V58.89     836.1   3. Difficulty walking  R26.2 719.7         Subjective   Patient reports that the knee is doing good, sore after the last visit.    Objective   See Exercise, Manual, and Modality Logs for complete treatment.       Assessment/Plan  Subjective reports continue to be good with regard to pain. Modified the exercise routine to include for proprioception activities.  Patient reported some pain in the medial knee with the lateral step ups on the bosu ball but it improved.  Patient had no increased pain with the jumping activity.      Timed:         Manual Therapy:    0     mins  79114;     Therapeutic Exercise:    9     mins  74119;     Neuromuscular Bettye:    10    mins  78358;    Therapeutic Activity:     8     mins  97877;     Gait Training      0    mins  80981;  Work Conditioning     0   mins  25561       Untimed:  Electrical Stimulation:    0     mins  21231 ( );      Timed Treatment:   27   mins   Total Treatment:     27   mins    Keith Villalobos, PT  [place] License: [id number]

## 2025-01-20 ENCOUNTER — TELEPHONE (OUTPATIENT)
Dept: PHYSICAL THERAPY | Facility: CLINIC | Age: 30
End: 2025-01-20

## 2025-01-20 NOTE — TELEPHONE ENCOUNTER
Caller: Vernell Ornelas    Relationship: Self       What was the call regarding: FAMILY ISSUE

## 2025-01-27 ENCOUNTER — OFFICE VISIT (OUTPATIENT)
Dept: ORTHOPEDIC SURGERY | Facility: CLINIC | Age: 30
End: 2025-01-27
Payer: COMMERCIAL

## 2025-01-27 VITALS — TEMPERATURE: 99.1 F | HEIGHT: 62 IN | BODY MASS INDEX: 24.03 KG/M2 | WEIGHT: 130.6 LBS

## 2025-01-27 DIAGNOSIS — Z98.890 STATUS POST ARTHROSCOPY OF KNEE: Primary | ICD-10-CM

## 2025-01-27 PROCEDURE — 99024 POSTOP FOLLOW-UP VISIT: CPT | Performed by: ORTHOPAEDIC SURGERY

## 2025-01-27 NOTE — PROGRESS NOTES
"Knee Scope follow Up       Patient: Vernell Ornelas        YOB: 1995      Chief Complaints: knee pain right      History of Present Illness: Pt is here f/u knee arthroscopy on the right she had a small lateral meniscus tear but also had a defect on the very medial aspect of the medial femoral condyle she states she is definitely better than she was before surgery there are a lot of things that she can do now that she could not do before including bicycling is now asymptomatic she wants to go along for spent and will need to be able to run I talked to her a little bit about cartilage grafting procedures and I really think she should sit down and talk to Dr. Artis Rhodes about this we will refer her and have her take x-rays and MRI with her        Allergies: No Known Allergies    Medications:   Home Medications:  Current Outpatient Medications on File Prior to Visit   Medication Sig    HYDROcodone-acetaminophen (NORCO) 5-325 MG per tablet Take 1 tablet by mouth Every 4 (Four) Hours As Needed for Severe Pain. (Patient not taking: Reported on 1/27/2025)    meloxicam (MOBIC) 15 MG tablet 1 PO Daily with food. (Patient not taking: Reported on 1/27/2025)     No current facility-administered medications on file prior to visit.     Current Medications:  Scheduled Meds:  Continuous Infusions:No current facility-administered medications for this visit.    PRN Meds:.          Physical Exam: 29 y.o. female  General Appearance:    Alert, cooperative, in no acute distress                   Vitals:    01/27/25 0916   Temp: 99.1 °F (37.3 °C)   TempSrc: Temporal   Weight: 59.2 kg (130 lb 9.6 oz)   Height: 157.5 cm (62\")   PainSc:   1   PainLoc: Knee      Patient is alert and oriented ×3 no acute distress normal mood physical exam.  Physical exam of the knee, incisions looked good there is no erythema, calf is soft and non-tender.  No sign or sx of DVT      Assessment  S/P knee scope.  Overall doing well. "         Plan: Continue with strengthening, progression of activities   knee arthroscopy on the right she had a small lateral meniscus tear but also had a defect on the very medial aspect of the medial femoral condyle she states she is definitely better than she was before surgery there are a lot of things that she can do now that she could not do before including bicycling is now asymptomatic she wants to go along for spent and will need to be able to run I talked to her a little bit about cartilage grafting procedures and I really think she should sit down and talk to Dr. Artis Rhodes about this we will refer her and have her take x-rays and MRI with

## 2025-02-13 ENCOUNTER — OFFICE VISIT (OUTPATIENT)
Dept: OBSTETRICS AND GYNECOLOGY | Facility: CLINIC | Age: 30
End: 2025-02-13
Payer: COMMERCIAL

## 2025-02-13 VITALS
DIASTOLIC BLOOD PRESSURE: 64 MMHG | BODY MASS INDEX: 23.15 KG/M2 | SYSTOLIC BLOOD PRESSURE: 118 MMHG | HEIGHT: 62 IN | WEIGHT: 125.8 LBS

## 2025-02-13 DIAGNOSIS — Z87.42 HISTORY OF ABNORMAL CERVICAL PAP SMEAR: Primary | ICD-10-CM

## 2025-02-13 DIAGNOSIS — R87.810 ASCUS WITH POSITIVE HIGH RISK HPV CERVICAL: ICD-10-CM

## 2025-02-13 DIAGNOSIS — Z11.51 SCREENING FOR HUMAN PAPILLOMAVIRUS (HPV): ICD-10-CM

## 2025-02-13 DIAGNOSIS — R87.610 ASCUS WITH POSITIVE HIGH RISK HPV CERVICAL: ICD-10-CM

## 2025-02-13 DIAGNOSIS — Z01.419 CERVICAL SMEAR, AS PART OF ROUTINE GYNECOLOGICAL EXAMINATION: ICD-10-CM

## 2025-02-13 LAB
B-HCG UR QL: NEGATIVE
BILIRUB BLD-MCNC: NEGATIVE MG/DL
CLARITY, POC: CLEAR
COLOR UR: YELLOW
EXPIRATION DATE: NORMAL
GLUCOSE UR STRIP-MCNC: NEGATIVE MG/DL
INTERNAL NEGATIVE CONTROL: NEGATIVE
INTERNAL POSITIVE CONTROL: POSITIVE
KETONES UR QL: NEGATIVE
LEUKOCYTE EST, POC: NEGATIVE
Lab: 55
NITRITE UR-MCNC: NEGATIVE MG/ML
PH UR: 5 [PH] (ref 5–8)
PROT UR STRIP-MCNC: NEGATIVE MG/DL
RBC # UR STRIP: NEGATIVE /UL
SP GR UR: 1 (ref 1–1.03)
UROBILINOGEN UR QL: NORMAL

## 2025-02-13 NOTE — PROGRESS NOTES
"      Vernell Ornelas is a 29 y.o. patient who presents for follow up of   Chief Complaint   Patient presents with    Follow-up       28 yo est pt here for 6 month repeat pap smear. In 7/2024 she had an ASCUS + HPV pap and her colpo bx showed LYLE 1 in 2 quadrants and a negative ECC. This is her first 6 month repeat pap smear. She has had Gardasil vaccine. She is on her cycle today.        The following portions of the patient's history were reviewed and updated as appropriate: allergies, current medications and problem list.    Review of Systems   Genitourinary:  Positive for vaginal bleeding. Negative for pelvic pain.       /64   Ht 157.5 cm (62.01\")   Wt 57.1 kg (125 lb 12.8 oz)   LMP 02/13/2025   BMI 23.00 kg/m²     Physical Exam  Vitals and nursing note reviewed. Exam conducted with a chaperone present.   Constitutional:       Appearance: Normal appearance. She is well-developed.   HENT:      Head: Normocephalic and atraumatic.   Eyes:      General: No scleral icterus.     Conjunctiva/sclera: Conjunctivae normal.   Neck:      Thyroid: No thyromegaly.   Abdominal:      General: There is no distension.      Palpations: Abdomen is soft. There is no mass.      Tenderness: There is no abdominal tenderness. There is no guarding or rebound.      Hernia: No hernia is present.   Genitourinary:     Vagina: Bleeding present.      Cervix: Normal.      Uterus: Normal.    Skin:     General: Skin is warm and dry.   Neurological:      Mental Status: She is alert and oriented to person, place, and time.   Psychiatric:         Behavior: Behavior normal.         Thought Content: Thought content normal.         Judgment: Judgment normal.         A/P:  1. H/O abnormal pap smear- check pap today. Will call with results and indicated followup  2. RHM- RTO in 6 months annual and repeat pap.     Assessment & Plan   Diagnoses and all orders for this visit:    1. History of abnormal cervical Pap smear (Primary)  -     POC " Urinalysis Dipstick  -     POC Pregnancy, Urine    2. Screening for human papillomavirus (HPV)  -     IGP, Rfx Aptima HPV ASCU                 No follow-ups on file.      Ashlie Moses MD    2/13/2025  11:43 EST

## 2025-02-18 LAB
CONV .: NORMAL
CYTOLOGIST CVX/VAG CYTO: NORMAL
CYTOLOGY CVX/VAG DOC CYTO: NORMAL
CYTOLOGY CVX/VAG DOC THIN PREP: NORMAL
DX ICD CODE: NORMAL
Lab: NORMAL
OTHER STN SPEC: NORMAL
SERVICE CMNT-IMP: NORMAL
STAT OF ADQ CVX/VAG CYTO-IMP: NORMAL

## (undated) DEVICE — TBG ARTHSCP PUMP W CONN/REDUC 8FT

## (undated) DEVICE — PROB ABL APOLLORF MP50 ASP 50DEG

## (undated) DEVICE — UNDERCAST PADDING: Brand: DEROYAL

## (undated) DEVICE — DRSNG WND GZ CURAD OIL EMULSION 3X3IN STRL

## (undated) DEVICE — SUT ETHLN 3/0 PS1 18IN 1663H

## (undated) DEVICE — BLD DISSCT COOL CUT SJ CRVD 4MM 13CM

## (undated) DEVICE — SKIN PREP TRAY W/CHG: Brand: MEDLINE INDUSTRIES, INC.

## (undated) DEVICE — GLV SURG BIOGEL LTX PF 6 1/2

## (undated) DEVICE — TBG ARTHSCP PT W CONN/REDUC 8FT

## (undated) DEVICE — BNDG,ELSTC,MATRIX,STRL,4"X5YD,LF,HOOK&LP: Brand: MEDLINE